# Patient Record
Sex: FEMALE | Race: BLACK OR AFRICAN AMERICAN | NOT HISPANIC OR LATINO | Employment: FULL TIME | URBAN - METROPOLITAN AREA
[De-identification: names, ages, dates, MRNs, and addresses within clinical notes are randomized per-mention and may not be internally consistent; named-entity substitution may affect disease eponyms.]

---

## 2017-08-04 ENCOUNTER — HOSPITAL ENCOUNTER (EMERGENCY)
Facility: OTHER | Age: 37
Discharge: HOME | End: 2017-08-04
Attending: EMERGENCY MEDICINE
Payer: MEDICAID

## 2017-08-04 VITALS
TEMPERATURE: 99 F | BODY MASS INDEX: 23 KG/M2 | DIASTOLIC BLOOD PRESSURE: 88 MMHG | HEIGHT: 62 IN | SYSTOLIC BLOOD PRESSURE: 137 MMHG | HEART RATE: 91 BPM | WEIGHT: 125 LBS | RESPIRATION RATE: 16 BRPM

## 2017-08-04 DIAGNOSIS — S60.229A CONTUSION OF DORSUM OF HAND: Primary | ICD-10-CM

## 2017-08-04 DIAGNOSIS — J06.9 URI WITH COUGH AND CONGESTION: ICD-10-CM

## 2017-08-04 LAB
B-HCG UR QL: NEGATIVE
CTP QC/QA: YES

## 2017-08-04 PROCEDURE — 99284 EMERGENCY DEPT VISIT MOD MDM: CPT

## 2017-08-04 PROCEDURE — 63600175 PHARM REV CODE 636 W HCPCS: Performed by: EMERGENCY MEDICINE

## 2017-08-04 PROCEDURE — 81025 URINE PREGNANCY TEST: CPT | Performed by: EMERGENCY MEDICINE

## 2017-08-04 PROCEDURE — 25000003 PHARM REV CODE 250: Performed by: EMERGENCY MEDICINE

## 2017-08-04 PROCEDURE — 90715 TDAP VACCINE 7 YRS/> IM: CPT | Performed by: EMERGENCY MEDICINE

## 2017-08-04 PROCEDURE — 90471 IMMUNIZATION ADMIN: CPT | Performed by: EMERGENCY MEDICINE

## 2017-08-04 RX ORDER — HYDROCODONE BITARTRATE AND ACETAMINOPHEN 5; 325 MG/1; MG/1
1 TABLET ORAL
Status: COMPLETED | OUTPATIENT
Start: 2017-08-04 | End: 2017-08-04

## 2017-08-04 RX ORDER — IBUPROFEN 400 MG/1
800 TABLET ORAL
Status: COMPLETED | OUTPATIENT
Start: 2017-08-04 | End: 2017-08-04

## 2017-08-04 RX ORDER — HYDROCODONE BITARTRATE AND ACETAMINOPHEN 5; 325 MG/1; MG/1
1 TABLET ORAL EVERY 4 HOURS PRN
Qty: 10 TABLET | Refills: 0 | Status: SHIPPED | OUTPATIENT
Start: 2017-08-04 | End: 2017-08-14

## 2017-08-04 RX ORDER — IBUPROFEN 600 MG/1
600 TABLET ORAL EVERY 6 HOURS PRN
Qty: 20 TABLET | Refills: 0 | Status: SHIPPED | OUTPATIENT
Start: 2017-08-04 | End: 2018-02-06

## 2017-08-04 RX ADMIN — CLOSTRIDIUM TETANI TOXOID ANTIGEN (FORMALDEHYDE INACTIVATED), CORYNEBACTERIUM DIPHTHERIAE TOXOID ANTIGEN (FORMALDEHYDE INACTIVATED), BORDETELLA PERTUSSIS TOXOID ANTIGEN (GLUTARALDEHYDE INACTIVATED), BORDETELLA PERTUSSIS FILAMENTOUS HEMAGGLUTININ ANTIGEN (FORMALDEHYDE INACTIVATED), BORDETELLA PERTUSSIS PERTACTIN ANTIGEN, AND BORDETELLA PERTUSSIS FIMBRIAE 2/3 ANTIGEN 0.5 ML: 5; 2; 2.5; 5; 3; 5 INJECTION, SUSPENSION INTRAMUSCULAR at 01:08

## 2017-08-04 RX ADMIN — HYDROCODONE BITARTRATE AND ACETAMINOPHEN 1 TABLET: 5; 325 TABLET ORAL at 01:08

## 2017-08-04 RX ADMIN — IBUPROFEN 800 MG: 400 TABLET, FILM COATED ORAL at 01:08
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
## 2018-02-06 ENCOUNTER — HOSPITAL ENCOUNTER (EMERGENCY)
Facility: OTHER | Age: 38
Discharge: HOME | End: 2018-02-06
Attending: EMERGENCY MEDICINE
Payer: MEDICAID

## 2018-02-06 VITALS
WEIGHT: 123 LBS | DIASTOLIC BLOOD PRESSURE: 80 MMHG | TEMPERATURE: 98 F | BODY MASS INDEX: 22.63 KG/M2 | SYSTOLIC BLOOD PRESSURE: 131 MMHG | HEIGHT: 62 IN | RESPIRATION RATE: 22 BRPM | OXYGEN SATURATION: 100 % | HEART RATE: 93 BPM

## 2018-02-06 DIAGNOSIS — R06.02 SHORTNESS OF BREATH: Primary | ICD-10-CM

## 2018-02-06 LAB
B-HCG UR QL: NEGATIVE
CTP QC/QA: YES

## 2018-02-06 PROCEDURE — 99284 EMERGENCY DEPT VISIT MOD MDM: CPT

## 2018-02-06 PROCEDURE — 81025 URINE PREGNANCY TEST: CPT | Performed by: EMERGENCY MEDICINE

## 2018-02-06 PROCEDURE — 93010 ELECTROCARDIOGRAM REPORT: CPT | Mod: ,,, | Performed by: INTERNAL MEDICINE

## 2018-02-06 RX ORDER — BUDESONIDE AND FORMOTEROL FUMARATE DIHYDRATE 160; 4.5 UG/1; UG/1
2 AEROSOL RESPIRATORY (INHALATION) EVERY 12 HOURS
COMMUNITY
End: 2024-01-04

## 2018-02-06 NOTE — ED TRIAGE NOTES
"Pt c/o SOB with sharp epigastric pain last night. Pt reports that she felt SOB with the epigastric pain, unable to move secondary to pain. Pt reports that she pressed on her epigastric region & states "It felt like something moved & then I coulb breathe." Pt reports feeling anxious with the onset of SOB last night. Pt c/o chest heaviness. Pt has been using her inhaler with mild to moderate relief, but states "I just feel like I can't catch my breath."  "

## 2018-02-07 NOTE — ED PROVIDER NOTES
Encounter Date: 2018       History     Chief Complaint   Patient presents with    Shortness of Breath     Reports a history of asthma and anxiety and states she had an anxiety attack last night and it has not been improving today.      Patient is a 37-year-old female with history of asthma and anxiety who presents to the emergency department with shortness of breath.  She states over the last 24 hours she has been feeling very anxious and nervous.  She states she is unsure if this is her asthma her anxiety.  She states she no longer has albuterol.  She only has Symbicort.  She states she has used her Symbicort 7 times a day in hopes to relieve her shortness of breath.  She states she feels as if her heart is racing.  She denies any chest pain.  She denies fevers or chills.  She denies recent travel or recent surgery.  She denies lower extremity edema.  She states she is not on birth control.  She denies history of previous blood clot.  She denies recent illness.  She denies recent cough.      The history is provided by the patient.     Review of patient's allergies indicates:  No Known Allergies  Past Medical History:   Diagnosis Date    Asthma     Migraine headache      Past Surgical History:   Procedure Laterality Date     SECTION       History reviewed. No pertinent family history.  Social History   Substance Use Topics    Smoking status: Never Smoker    Smokeless tobacco: Never Used    Alcohol use No     Review of Systems   Constitutional: Negative for activity change, appetite change, chills, fatigue and fever.   HENT: Negative for congestion, ear discharge, ear pain, nosebleeds, postnasal drip, sore throat and trouble swallowing.    Eyes: Negative for photophobia and visual disturbance.   Respiratory: Positive for shortness of breath.    Cardiovascular: Negative for chest pain and leg swelling.   Gastrointestinal: Negative for abdominal pain, blood in stool, constipation, diarrhea, nausea  and vomiting.   Genitourinary: Negative for dysuria, flank pain and hematuria.   Musculoskeletal: Negative for back pain, neck pain and neck stiffness.   Skin: Negative for rash and wound.   Neurological: Negative for dizziness, syncope, speech difficulty, weakness, light-headedness, numbness and headaches.       Physical Exam     Initial Vitals [02/06/18 1114]   BP Pulse Resp Temp SpO2   (!) 158/102 110 (!) 22 98.1 °F (36.7 °C) 100 %      MAP       120.67         Physical Exam    Nursing note and vitals reviewed.  Constitutional: She appears well-developed and well-nourished. She is not diaphoretic.  Non-toxic appearance. No distress.   anxious   HENT:   Head: Normocephalic.   Right Ear: Hearing and external ear normal.   Left Ear: Hearing and external ear normal.   Nose: Nose normal.   Mouth/Throat: Uvula is midline, oropharynx is clear and moist and mucous membranes are normal. No trismus in the jaw. No uvula swelling. No oropharyngeal exudate.   Eyes: Conjunctivae are normal. Pupils are equal, round, and reactive to light.   Neck: Normal range of motion and full passive range of motion without pain. Neck supple. Normal range of motion present. No neck rigidity.   Cardiovascular: Normal rate, regular rhythm and normal heart sounds.   No lower extremity edema   Pulmonary/Chest: Breath sounds normal. No respiratory distress. She has no wheezes. She has no rhonchi. She has no rales. She exhibits no tenderness.   Abdominal: Soft. Bowel sounds are normal. There is no tenderness.   Lymphadenopathy:     She has no cervical adenopathy.   Neurological: She is alert and oriented to person, place, and time. She has normal strength.   Skin: Skin is warm and dry. Capillary refill takes less than 2 seconds.   Psychiatric: Her mood appears anxious. Her speech is rapid and/or pressured. She expresses no homicidal and no suicidal ideation. She expresses no suicidal plans and no homicidal plans.         ED Course    Procedures  Labs Reviewed   POCT URINE PREGNANCY     EKG Readings: (Independently Interpreted)   Initial Reading: No STEMI. Heart Rate: 76.     ECG Results          EKG 12-lead (Final result)  Result time 02/06/18 16:48:25    Final result by Interface, Lab In [facility] (02/06/18 16:48:25)                 Narrative:    Test Reason : R06.02  Blood Pressure :mmHG  Vent. Rate : 076 BPM     Atrial Rate : 076 BPM     P-R Int : 122 ms          QRS Dur : 086 ms      QT Int : 372 ms       P-R-T Axes : 072 081 057 degrees     QTc Int : 418 ms    Normal sinus rhythm with sinus arrhythmia  Normal ECG    Confirmed by Adalid England MD ([id number]) on 2/6/2018 4:48:13 PM    Referred By: LILIANA   SELF           Confirmed By:Adalid England MD                            X-Rays:   Independently Interpreted Readings:   Other Readings:  No acute cardiopulmonary process    Imaging Results          X-Ray Chest PA And Lateral (Final result)  Result time 02/06/18 13:21:10    Final result by Rochelle Wilson MD (02/06/18 13:21:10)                 Impression:     No acute cardiopulmonary process.          Electronically signed by: ROCHELLE WILSON MD  Date:     02/06/18  Time:    13:21              Narrative:    Chest PA and lateral    Indication:Shortness of breath    Comparison:None    Findings:  The cardiomediastinal silhouette is not enlarged.  There is no pleural effusion.  The trachea is midline.  The lungs are symmetrically expanded bilaterally without evidence of acute parenchymal process. No large focal consolidation seen.  There is no pneumothorax.  The osseous structures remarkable for mild dextroscoliotic curvature of the thoracic spine.                              Medical Decision Making:   Initial Assessment:   Urgent evaluation of 37-year-old female with history of anxiety and asthma who presents to the emergency department with shortness of breath.  Patient is afebrile, nontoxic appearing, and hemodynamically stable.   Lungs are clear to auscultation.  No lower extremity edema.  Patient is not hypoxic.  Patient is very anxious on initial exam.  She is tachycardic initially which I suspect is secondary to her anxious state.  EKG and chest x-ray will be obtained.  ED Management:  EKG reveals no acute ischemia.  Patient is no longer tachycardic.  Patient is not hypoxic.  Chest x-ray reveals no acute cardiopulmonary process.  I do not suspect asthma exacerbation.  I do not suspect PE.  Upon reevaluation, patient states she is feeling much better and is ready to be discharged.  She is advised to follow-up with PCP.  She is instructed to not use Symbicort as a rescue inhaler.  She is advised to return to the emergency department with any worsening symptoms or concerns.  Other:   I have discussed this case with another health care provider.       <> Summary of the Discussion: Dr. Bo                   ED Course      Clinical Impression:   The encounter diagnosis was Shortness of breath.                           Dana Price PA-C  02/06/18 1703

## 2018-05-11 ENCOUNTER — HOSPITAL ENCOUNTER (EMERGENCY)
Facility: OTHER | Age: 38
Discharge: HOME | End: 2018-05-11
Attending: EMERGENCY MEDICINE
Payer: MEDICAID

## 2018-05-11 VITALS
WEIGHT: 120 LBS | HEIGHT: 62 IN | BODY MASS INDEX: 22.08 KG/M2 | OXYGEN SATURATION: 100 % | SYSTOLIC BLOOD PRESSURE: 137 MMHG | DIASTOLIC BLOOD PRESSURE: 85 MMHG | TEMPERATURE: 98 F | RESPIRATION RATE: 18 BRPM | HEART RATE: 65 BPM
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
## 2018-05-11 DIAGNOSIS — S63.502A LEFT WRIST SPRAIN, INITIAL ENCOUNTER: Primary | ICD-10-CM

## 2018-05-11 LAB
B-HCG UR QL: NEGATIVE
CTP QC/QA: YES

## 2018-05-11 PROCEDURE — 25000003 PHARM REV CODE 250: Performed by: EMERGENCY MEDICINE

## 2018-05-11 PROCEDURE — 99284 EMERGENCY DEPT VISIT MOD MDM: CPT | Mod: 25

## 2018-05-11 PROCEDURE — 81025 URINE PREGNANCY TEST: CPT | Performed by: EMERGENCY MEDICINE

## 2018-05-11 RX ORDER — IBUPROFEN 600 MG/1
600 TABLET ORAL
Status: COMPLETED | OUTPATIENT
Start: 2018-05-11 | End: 2018-05-11

## 2018-05-11 RX ORDER — OXYCODONE AND ACETAMINOPHEN 5; 325 MG/1; MG/1
1 TABLET ORAL EVERY 6 HOURS PRN
Qty: 5 TABLET | Refills: 0 | Status: SHIPPED | OUTPATIENT
Start: 2018-05-11 | End: 2018-11-04 | Stop reason: SDUPTHER

## 2018-05-11 RX ORDER — IBUPROFEN 600 MG/1
600 TABLET ORAL EVERY 6 HOURS PRN
Qty: 9 TABLET | Refills: 0 | Status: SHIPPED | OUTPATIENT
Start: 2018-05-11 | End: 2018-09-12

## 2018-05-11 RX ADMIN — IBUPROFEN 600 MG: 600 TABLET, FILM COATED ORAL at 04:05

## 2018-05-11 NOTE — ED PROVIDER NOTES
Encounter Date: 2018    SCRIBE #1 NOTE: ICassandra, am scribing for, and in the presence of, Dr. Chang.       History     Chief Complaint   Patient presents with    Fall     Pt tripped and fell while walking her dog. C/o left wrist pain.        2018 4:10 PM     Chief complaint: Wrist pain      Anay Louis is a 37 y.o. female with no pertinent medical history who presents to the ED with complaints of left wrist pain radiating to her thumb s/p mechanical fall onto outstretched hand that occurred around 12:45 PM today. The patient reports that she fell after getting caught in the leash when her dog suddenly started chasing a cat. She states that she did not hit her head or lose consciousness. The pain is exacerbated by movement, and is not improved with ice, and the patient states that she has not taken any medication for the pain. The pt states that she is ambidextrous, but primarily uses her right hand. She has no additional complaints at this time.       The history is provided by the patient.     Review of patient's allergies indicates:  No Known Allergies  Past Medical History:   Diagnosis Date    Asthma     Migraine headache      Past Surgical History:   Procedure Laterality Date     SECTION       History reviewed. No pertinent family history.  Social History   Substance Use Topics    Smoking status: Never Smoker    Smokeless tobacco: Never Used    Alcohol use No     Review of Systems   Constitutional: Negative for fever.   HENT: Negative for sore throat.    Respiratory: Negative for shortness of breath.    Cardiovascular: Negative for chest pain.   Gastrointestinal: Negative for nausea.   Genitourinary: Negative for dysuria.   Musculoskeletal: Positive for arthralgias ( left wrist). Negative for back pain.   Skin: Negative for rash.   Neurological: Negative for weakness.        Negative for LOC.   Hematological: Does not bruise/bleed easily.       Physical Exam     Initial  Vitals [05/11/18 1435]   BP Pulse Resp Temp SpO2   125/81 74 18 97.2 °F (36.2 °C) 100 %      MAP       95.67         Physical Exam    Nursing note and vitals reviewed.  Constitutional: She appears well-developed and well-nourished. She is not diaphoretic. No distress.   HENT:   Head: Normocephalic and atraumatic.   Eyes: EOM are normal. Pupils are equal, round, and reactive to light.   Neck: Normal range of motion. Neck supple.   Cardiovascular: Normal rate, regular rhythm and normal heart sounds. Exam reveals no gallop and no friction rub.    No murmur heard.  Pulses:       Radial pulses are 2+ on the left side.   Pulmonary/Chest: Breath sounds normal. No respiratory distress. She has no wheezes.   Musculoskeletal: Normal range of motion.   There is no tenderness noted over the left scaphoid. There is tenderness over the left distal radius. There is mild tenderness to palpation over the left ulna and the first metacarpal.    Neurological: She is alert and oriented to person, place, and time. She has normal strength and normal reflexes.   Skin: Skin is warm and dry. Capillary refill takes less than 2 seconds. No rash noted.   Cap refill to the LUE is < 2s.   Psychiatric: She has a normal mood and affect.         ED Course   Procedures  Labs Reviewed   POCT URINE PREGNANCY        Imaging Results          X-Ray Wrist Navicular Views Left (Final result)  Result time 05/11/18 16:42:36    Final result by Gee Rosado MD (05/11/18 16:42:36)                 Impression:      As above.      Electronically signed by: Gee Rosado MD  Date:    05/11/2018  Time:    16:42             Narrative:    EXAMINATION:  XR WRIST NAVICULAR VIEWS LEFT    CLINICAL HISTORY:  trauma;    TECHNIQUE:  Left wrist, four views    COMPARISON:  None    05/11/2018    FINDINGS:  No fracture or dislocation.  Navicular is intact.  Note made of lunotriquetral coalition.  Cartilage spaces are maintained. Soft tissues are unremarkable.                                X-Ray Hand 3 View Left (Final result)  Result time 05/11/18 16:43:05    Final result by Gee Rosado MD (05/11/18 16:43:05)                 Impression:      As above.      Electronically signed by: Gee Rosado MD  Date:    05/11/2018  Time:    16:43             Narrative:    EXAMINATION:  XR HAND COMPLETE 3 VIEW LEFT    CLINICAL HISTORY:  Trauma;    TECHNIQUE:  PA, lateral, and oblique views of the left hand were performed.    COMPARISON:  08/04/2017    FINDINGS:  No fracture or dislocation.  No periarticular osteopenia or erosion.  Cartilage spaces are maintained.  Lunotriquetral coalition noted.  Soft tissues are unremarkable.                               X-Ray Wrist Complete Left (Final result)  Result time 05/11/18 15:05:53    Final result by Angelita Coello MD (05/11/18 15:05:53)                 Impression:      As above      Electronically signed by: Angelita Coello MD  Date:    05/11/2018  Time:    15:05             Narrative:    EXAMINATION:  XR WRIST COMPLETE 3 VIEWS LEFT    CLINICAL HISTORY:  Unspecified fall, initial encounter    TECHNIQUE:  PA, lateral, and oblique views of the left wrist were performed.    COMPARISON:  None    FINDINGS:  There is no evidence of fracture or osseous destruction.  No significant degenerative changes present.                                X-Rays:   Independently Interpreted Readings:   Other Readings:  X-ray left hand: No acute fractures or dislocations visualized.   X-ray left scaphoid: No acute fracture or dislocation visualized.    Medical Decision Making:   History:   Old Medical Records: I decided to obtain old medical records.  Clinical Tests:   Lab Tests: Ordered and Reviewed  Radiological Study: Ordered and Reviewed            Scribe Attestation:   Scribe #1: I performed the above scribed service and the documentation accurately describes the services I performed. I attest to the accuracy of the note.    Attending Attestation:            Physician Attestation for Scribe:  Physician Attestation Statement for Scribe #1: I, Dr. Hunter, reviewed documentation, as scribed by Cassandra Ayala in my presence, and it is both accurate and complete.         Attending ED Notes:   Emergent evaluation a 37-year-old female with left wrist and hand pain status post trauma.  Patient no vastly intact without focal neurologic deficits.  No deformity, crepitus or step-offs.  X-rays are negative for any acute fractures or dislocations.  No tenderness over the scaphoid bone.  The patient is extensively counseled on her diagnosis and treatment including all diagnostic and physical exam findings.  The patient discharged good condition and directed follow-up with her PCP in the next 24-48 hours.             Clinical Impression:   The primary encounter diagnosis was Left wrist sprain, initial encounter. A diagnosis of Fall was also pertinent to this visit.                           Jose Cruz Hunter MD  05/12/18 [id number]

## 2018-09-12 ENCOUNTER — HOSPITAL ENCOUNTER (EMERGENCY)
Facility: OTHER | Age: 38
Discharge: HOME | End: 2018-09-12
Attending: EMERGENCY MEDICINE
Payer: MEDICAID

## 2018-09-12 VITALS
BODY MASS INDEX: 22.08 KG/M2 | RESPIRATION RATE: 18 BRPM | DIASTOLIC BLOOD PRESSURE: 63 MMHG | HEIGHT: 62 IN | SYSTOLIC BLOOD PRESSURE: 127 MMHG | TEMPERATURE: 98 F | WEIGHT: 120 LBS | OXYGEN SATURATION: 100 % | HEART RATE: 73 BPM

## 2018-09-12 DIAGNOSIS — S39.012A STRAIN OF LUMBAR REGION, INITIAL ENCOUNTER: ICD-10-CM

## 2018-09-12 DIAGNOSIS — S29.019A THORACIC MYOFASCIAL STRAIN, INITIAL ENCOUNTER: Primary | ICD-10-CM

## 2018-09-12 DIAGNOSIS — M54.9 BACK PAIN: ICD-10-CM

## 2018-09-12 LAB
B-HCG UR QL: NEGATIVE
B-HCG UR QL: NEGATIVE
CTP QC/QA: YES
CTP QC/QA: YES

## 2018-09-12 PROCEDURE — 99284 EMERGENCY DEPT VISIT MOD MDM: CPT | Mod: 25

## 2018-09-12 PROCEDURE — 81025 URINE PREGNANCY TEST: CPT | Performed by: EMERGENCY MEDICINE

## 2018-09-12 PROCEDURE — 63600175 PHARM REV CODE 636 W HCPCS: Performed by: PHYSICIAN ASSISTANT

## 2018-09-12 PROCEDURE — 96372 THER/PROPH/DIAG INJ SC/IM: CPT

## 2018-09-12 RX ORDER — KETOROLAC TROMETHAMINE 30 MG/ML
15 INJECTION, SOLUTION INTRAMUSCULAR; INTRAVENOUS
Status: COMPLETED | OUTPATIENT
Start: 2018-09-12 | End: 2018-09-12

## 2018-09-12 RX ORDER — IBUPROFEN 600 MG/1
600 TABLET ORAL EVERY 6 HOURS PRN
Qty: 20 TABLET | Refills: 0 | Status: SHIPPED | OUTPATIENT
Start: 2018-09-12 | End: 2018-11-02 | Stop reason: SDUPTHER

## 2018-09-12 RX ADMIN — KETOROLAC TROMETHAMINE 15 MG: 30 INJECTION, SOLUTION INTRAMUSCULAR at 03:09

## 2018-09-12 NOTE — ED PROVIDER NOTES
"Encounter Date: 2018       History     Chief Complaint   Patient presents with    Back Pain     middle back pain, "for some time"     Patient is 38 year old female who presents with complaints of mid and lower back pain that has been present for 3 days PTA. She reports that she is usually seen at a pain management clinic for neck pain. She admits that she was told that because this was "new pain" she needed x-rays before she could be treated. Her neck pain is currently managed by a muscle relaxer tizanidine. She retorts that this has not bee helping this mid-lower back pain. She admits that this current back pain started when she was walking her pit bull two days ago. She reports the dog jolted her and she admits ever since she has had some back pain. She reports no associated fever, chills, nausea, vomiting, chest pain or shortness of breath.  No associated bladder or bowel incontinence or saddle anesthesia.  No history of IV drug abuse or back surgeries.  She has not been taking any medications in addition to the prescribed tizanidine.  Currently unaccompanied in the ER.          Review of patient's allergies indicates:  No Known Allergies  Past Medical History:   Diagnosis Date    Asthma     Migraine headache      Past Surgical History:   Procedure Laterality Date     SECTION       No family history on file.  Social History     Tobacco Use    Smoking status: Never Smoker    Smokeless tobacco: Never Used   Substance Use Topics    Alcohol use: No    Drug use: No     Review of Systems   Constitutional: Negative for fever.   HENT: Negative for sore throat.    Respiratory: Negative for shortness of breath.    Cardiovascular: Negative for chest pain.   Gastrointestinal: Negative for nausea.   Genitourinary: Negative for dysuria.   Musculoskeletal: Positive for back pain.   Skin: Negative for rash.   Neurological: Negative for weakness.   Hematological: Does not bruise/bleed easily.       Physical " Exam     Initial Vitals [09/12/18 1444]   BP Pulse Resp Temp SpO2   127/89 91 18 98.7 °F (37.1 °C) 100 %      MAP       --         Physical Exam    Nursing note and vitals reviewed.  Constitutional: She appears well-developed and well-nourished. She is not diaphoretic. No distress.   Healthy appearing female in NAD who appears uncomfortable during interview and exam. She is able to sit in upright position and transitions to standing position very cautiously and winces in pain when doing so.  She is able to ambulate without assistance.   HENT:   Head: Normocephalic and atraumatic.   Eyes: Conjunctivae and EOM are normal. Pupils are equal, round, and reactive to light. Right eye exhibits no discharge. Left eye exhibits no discharge. No scleral icterus.   Neck: Normal range of motion.   Cardiovascular: Normal rate, regular rhythm, normal heart sounds and intact distal pulses. Exam reveals no gallop and no friction rub.    No murmur heard.  Pulmonary/Chest: Breath sounds normal. She has no wheezes. She has no rhonchi. She has no rales.   Abdominal: Soft. Bowel sounds are normal. There is no tenderness. There is no rebound and no guarding.   Musculoskeletal: Normal range of motion. She exhibits no edema or tenderness.   Patient winces in pain with thoracic and lumbar midline palpation there is no bony crepitus or step-offs appreciated.  There is also thoracic and lumbar paraspinal musculature tenderness to palpation with no overlying skin changes.   Lymphadenopathy:     She has no cervical adenopathy.   Neurological: She is alert and oriented to person, place, and time. She has normal strength. No cranial nerve deficit or sensory deficit.   Normal gait   Skin: Skin is warm. No rash and no abscess noted. No erythema.   Psychiatric: She has a normal mood and affect. Her behavior is normal. Thought content normal.         ED Course   Procedures  Labs Reviewed   POCT URINE PREGNANCY         Imaging Results          X-Ray  Thoracic Spine AP Lateral (Final result)  Result time 09/12/18 16:30:58    Final result by Angelita Coello MD (09/12/18 16:30:58)                 Impression:      As above      Electronically signed by: Angelita Coello MD  Date:    09/12/2018  Time:    16:30             Narrative:    EXAMINATION:  XR THORACIC SPINE AP LATERAL    CLINICAL HISTORY:  Dorsalgia, unspecified    TECHNIQUE:  Two views    COMPARISON:  None    FINDINGS:  There is slight curvature of the thoracic spine.  Vertebral bodies are normal in height and alignment.  There is no significant degree of degenerative change.                               X-Ray Lumbar Spine Ap And Lateral (Final result)  Result time 09/12/18 16:32:00    Final result by Angelita Coello MD (09/12/18 16:32:00)                 Impression:      No significant abnormality      Electronically signed by: Angelita Coello MD  Date:    09/12/2018  Time:    16:32             Narrative:    EXAMINATION:  XR LUMBAR SPINE AP AND LATERAL    CLINICAL HISTORY:  back pain;Dorsalgia, unspecified    TECHNIQUE:  AP, lateral and spot images were performed of the lumbar spine.    COMPARISON:  None    FINDINGS:  Vertebral bodies are normally aligned and normal in height.  Disc spaces are maintained without significant osteophytic spurring.                                     Medical Decision Making:   ED Management:  Urgent evaluation a 38-year-old female who presents with complaints of back pain likely related to musculoskeletal strain from being jolted while walking her dog.  She is afebrile, nontoxic appearing, hemodynamically stable.  Physical exam outlined above and reveals soft tissue tenderness to palpation with overlying the thoracic and lumbar midline tenderness to palpation as well however there is no bony crepitus or step-off.  X-rays pending.  Will give Toradol and reassess.    Update:  X-ray reveals nothing acute. The patient reports feeling better after Toradol.  Will discharge  with ibuprofen 600 mg to be taken every 6 hr for at least 3 days in conjunction with her previously prescribed muscle relaxer.  She is encouraged to follow up with her pain management doctor in the outpatient setting.  She is educated on ED return precautions and verbalizes understanding.                      Clinical Impression:   The primary encounter diagnosis was Thoracic myofascial strain, initial encounter. Diagnoses of Back pain and Strain of lumbar region, initial encounter were also pertinent to this visit.                             Cleo Andrews PA-C  09/12/18 3686

## 2018-09-12 NOTE — ED NOTES
"Pt reporting mid lower back pain x 1 week after "I was walking my dog earlier thi week and I had to run after him on his leash and I may have moved weird." Pt denies numbness in bilateral LE's, or loss of bowel or bladder dysfunction. No bruising or deformity noted to back. Pt AAOx4 and appropriate at this time. Respirations even and unlabored. No acute distress noted.    "

## 2018-11-02 ENCOUNTER — HOSPITAL ENCOUNTER (EMERGENCY)
Facility: OTHER | Age: 38
Discharge: HOME | End: 2018-11-03
Attending: EMERGENCY MEDICINE
Payer: MEDICAID

## 2018-11-02 DIAGNOSIS — S63.011A: ICD-10-CM

## 2018-11-02 DIAGNOSIS — M25.531 RIGHT WRIST PAIN: ICD-10-CM

## 2018-11-02 DIAGNOSIS — S62.521A CLOSED FRACTURE OF BASE OF DISTAL PHALANX OF RIGHT THUMB: Primary | ICD-10-CM

## 2018-11-02 LAB
B-HCG UR QL: NEGATIVE
CTP QC/QA: YES

## 2018-11-02 PROCEDURE — 29125 APPL SHORT ARM SPLINT STATIC: CPT | Mod: RT

## 2018-11-02 PROCEDURE — 99283 EMERGENCY DEPT VISIT LOW MDM: CPT | Mod: 25

## 2018-11-02 PROCEDURE — 81025 URINE PREGNANCY TEST: CPT | Performed by: EMERGENCY MEDICINE

## 2018-11-02 PROCEDURE — 25000003 PHARM REV CODE 250: Performed by: EMERGENCY MEDICINE

## 2018-11-02 RX ORDER — IBUPROFEN 400 MG/1
800 TABLET ORAL
Status: COMPLETED | OUTPATIENT
Start: 2018-11-02 | End: 2018-11-02

## 2018-11-02 RX ORDER — IBUPROFEN 400 MG/1
400 TABLET ORAL EVERY 6 HOURS PRN
Qty: 20 TABLET | Refills: 0 | Status: SHIPPED | OUTPATIENT
Start: 2018-11-02 | End: 2019-02-04

## 2018-11-02 RX ORDER — ACETAMINOPHEN 500 MG
1000 TABLET ORAL
Status: COMPLETED | OUTPATIENT
Start: 2018-11-02 | End: 2018-11-02

## 2018-11-02 RX ADMIN — ACETAMINOPHEN 1000 MG: 500 TABLET, FILM COATED ORAL at 10:11

## 2018-11-02 RX ADMIN — IBUPROFEN 800 MG: 400 TABLET, FILM COATED ORAL at 11:11

## 2018-11-03 VITALS
DIASTOLIC BLOOD PRESSURE: 80 MMHG | BODY MASS INDEX: 21.16 KG/M2 | HEART RATE: 77 BPM | HEIGHT: 62 IN | WEIGHT: 115 LBS | OXYGEN SATURATION: 95 % | TEMPERATURE: 98 F | SYSTOLIC BLOOD PRESSURE: 139 MMHG | RESPIRATION RATE: 18 BRPM

## 2018-11-03 NOTE — ED TRIAGE NOTES
"Pt presents to the ed with c/o right hand injury. Pt states, " I hurt my thumb and wrist trying to get the trunk of my car open." Pt is aao, RR even and unlabored. NAD noted.  "

## 2018-11-03 NOTE — ED PROVIDER NOTES
Encounter Date: 2018    SCRIBE #1 NOTE: IMary Beth, am scribing for, and in the presence of, Dr. Cotto.       History     Chief Complaint   Patient presents with    Hand Injury     R thumb, and wrist pain after twisting in car handle.      Time seen by provider: 9:57 PM    This is a 38 y.o. female who presents s/p right hand injury that occurred tonight. The patient states she was trying to open the trunk of her car when she twisted her right thumb. She reports pain to right thumb that radiates to remainder of right hand and wrist. She denies any numbness or tingling. Patient denies any other complaints at this time.      The history is provided by the patient.     Review of patient's allergies indicates:  No Known Allergies  Past Medical History:   Diagnosis Date    Asthma     Migraine headache      Past Surgical History:   Procedure Laterality Date     SECTION       No family history on file.  Social History     Tobacco Use    Smoking status: Never Smoker    Smokeless tobacco: Never Used   Substance Use Topics    Alcohol use: No    Drug use: No     Review of Systems   Constitutional: Negative for chills and fever.   HENT: Negative for congestion, sore throat and trouble swallowing.    Eyes: Negative for visual disturbance.   Respiratory: Negative for shortness of breath.    Cardiovascular: Negative for chest pain.   Gastrointestinal: Negative for abdominal pain, nausea and vomiting.   Endocrine: Negative.    Genitourinary: Negative for dysuria.   Musculoskeletal: Positive for arthralgias.        Positive for hand pain.   Skin: Negative for wound.   Neurological: Negative for numbness and headaches.        Negative for tingling.   Psychiatric/Behavioral: Negative for confusion.       Physical Exam     Initial Vitals [18 2148]   BP Pulse Resp Temp SpO2   (!) 157/89 87 16 99 °F (37.2 °C) 100 %      MAP       --         Physical Exam    Nursing note and vitals reviewed.  Constitutional:  She appears well-developed and well-nourished. No distress.   HENT:   Head: Normocephalic and atraumatic.   Cardiovascular: Normal rate, regular rhythm and intact distal pulses.   2+ distal pulses.   Pulmonary/Chest: No respiratory distress.   Musculoskeletal: Normal range of motion. She exhibits no edema.   Right thumb diffusely tender. No bony tenderness. No swelling of right hand. No bony tenderness of wrist.   Neurological: She is alert and oriented to person, place, and time. She has normal strength. No sensory deficit.   Sensation intact of all 5 digits.   Skin: Capillary refill takes less than 2 seconds. No rash noted.   Psychiatric: She has a normal mood and affect. Her behavior is normal. Judgment and thought content normal.         ED Course   Splint Application  Date/Time: 11/3/2018 5:32 AM  Performed by: Josue Cotto MD  Authorized by: Josue Cotto MD   Consent Done: Not Needed  Location details: left thumb  Splint type: thumb spica (and radial gutter)  Supplies used: Ortho-Glass,  cotton padding and elastic bandage  Post-procedure: The splinted body part was neurovascularly unchanged following the procedure.  Patient tolerance: Patient tolerated the procedure well with no immediate complications        Labs Reviewed   POCT URINE PREGNANCY          Imaging Results          X-Ray Wrist Complete Right (In process)                X-Ray Hand 3 view Right (In process)                  Medical Decision Making:   Clinical Tests:   Lab Tests: Ordered and Reviewed  Radiological Study: Ordered and Reviewed  ED Management:  Well-appearing patient presents with right hand and wrist pain after she jammed it while close to get a trunk.  She is tender over the thumb and the radial aspect of her wrist.  X-ray demonstrates a distal phalanx base fracture on her thumb, and radial ulnar distal subluxation.  Placed in a thumb spica and radial gutter.  Provided with Hand surgery follow-up.  Ibuprofen for pain.  No other  injuries.  Stable for discharge.    I did have an extensive talk regarding signs to return for and need for follow up. Patient expressed understanding and will monitor symptoms closely and follow-up as needed.      OCTAVIO Cotto M.D.  11/03/2018  7:14 AM              Scribe Attestation:   Scribe #1: I performed the above scribed service and the documentation accurately describes the services I performed. I attest to the accuracy of the note.    Attending Attestation:           Physician Attestation for Scribe:  Physician Attestation Statement for Scribe #1: I, Dr. Cotto, reviewed documentation, as scribed by Mary Beth Louis in my presence, and it is both accurate and complete.                    Clinical Impression:     1. Closed fracture of base of distal phalanx of right thumb    2. Right wrist pain    3. Closed traumatic subluxation of distal radioulnar joint of right wrist, initial encounter                                 Josue Cotto MD  11/03/18 [id number]

## 2018-11-04 ENCOUNTER — HOSPITAL ENCOUNTER (EMERGENCY)
Facility: OTHER | Age: 38
Discharge: HOME | End: 2018-11-04
Attending: EMERGENCY MEDICINE
Payer: MEDICAID

## 2018-11-04 VITALS
HEIGHT: 62 IN | OXYGEN SATURATION: 98 % | BODY MASS INDEX: 21.16 KG/M2 | TEMPERATURE: 99 F | DIASTOLIC BLOOD PRESSURE: 59 MMHG | RESPIRATION RATE: 18 BRPM | SYSTOLIC BLOOD PRESSURE: 102 MMHG | WEIGHT: 115 LBS | HEART RATE: 101 BPM

## 2018-11-04 DIAGNOSIS — M25.531 RIGHT WRIST PAIN: Primary | ICD-10-CM

## 2018-11-04 PROCEDURE — 99283 EMERGENCY DEPT VISIT LOW MDM: CPT

## 2018-11-04 RX ORDER — OXYCODONE AND ACETAMINOPHEN 5; 325 MG/1; MG/1
1 TABLET ORAL EVERY 6 HOURS PRN
Qty: 12 TABLET | Refills: 0 | OUTPATIENT
Start: 2018-11-04 | End: 2022-12-29

## 2018-11-04 NOTE — ED TRIAGE NOTES
Pt reports injuring her R thumb/wrist on Wednesday. Pt was seen Friday and splinted. Pt reports pain has become worse than it was on Friday. Swelling noted to digits on R hand. Reports some tingling.

## 2018-11-04 NOTE — ED PROVIDER NOTES
Encounter Date: 2018    SCRIBE #1 NOTE: IFer, adrian scribing for, and in the presence of, Dr. Jasmine.       History     Chief Complaint   Patient presents with    Wrist Pain     reports thumb/wrist fracture on Wednesday. splint applied Friday. pain uncontrolled with swelling to L hand finger. fingers warm with cap refill less than 3 seconds. fingers painful to touch     Time seen by provider: 3:29 PM    This is a 38 y.o. female who is presents with complaint of right wrist pain that began approximately five days ago. She was seen on 18 and was treated for an acute intra-articular fracture of the first digit. She also has a radial ulnar joint subluxation, and was sent home with in a splint. She is now experiencing finger swelling and tingling. She reports that she has been taking 400 mg ibuprofen prior to arrival which has not relieved her pain. The pain is mainly in the thumb, but it radiates to the wrist. She denies fever, chills, sore throat, chest pain, shortness of breath, cough, nausea, and dysuria. The patient reports that she is ambidextrous.      The history is provided by the patient.     Review of patient's allergies indicates:  No Known Allergies  Past Medical History:   Diagnosis Date    Asthma     Migraine headache      Past Surgical History:   Procedure Laterality Date     SECTION       History reviewed. No pertinent family history.  Social History     Tobacco Use    Smoking status: Never Smoker    Smokeless tobacco: Never Used   Substance Use Topics    Alcohol use: No    Drug use: No     Review of Systems   Constitutional: Negative for chills and fever.   HENT: Negative for congestion, rhinorrhea and sore throat.    Respiratory: Negative for cough and shortness of breath.    Cardiovascular: Negative for chest pain.   Gastrointestinal: Negative for nausea and vomiting.   Genitourinary: Negative for dysuria.   Musculoskeletal: Negative for back pain.        Positive  for right wrist and thumb pain. Positive for finger swelling.   Skin: Negative for rash.   Neurological: Negative for weakness.   Hematological: Does not bruise/bleed easily.       Physical Exam     Initial Vitals [11/04/18 1518]   BP Pulse Resp Temp SpO2   (!) 102/59 101 18 98.5 °F (36.9 °C) 98 %      MAP       --         Physical Exam    Nursing note and vitals reviewed.  Constitutional: She appears well-developed and well-nourished. She is not diaphoretic. No distress.   HENT:   Head: Normocephalic and atraumatic.   Mouth/Throat: Oropharynx is clear and moist.   Eyes: Conjunctivae and EOM are normal. Pupils are equal, round, and reactive to light.   Cardiovascular: Normal rate, regular rhythm and normal heart sounds. Exam reveals no gallop and no friction rub.    No murmur heard.  Pulses:       Radial pulses are 2+ on the right side, and 2+ on the left side.   Pulmonary/Chest: Breath sounds normal. No respiratory distress. She has no wheezes. She has no rhonchi. She has no rales.   Abdominal: Soft. There is no tenderness. There is no rebound and no guarding.   Musculoskeletal: Normal range of motion. She exhibits no edema or tenderness.   RUE: Mild swelling to all fingers and hand.    Neurological: She is alert and oriented to person, place, and time. She has normal strength.   Skin: Skin is warm and dry. Capillary refill takes less than 2 seconds. No rash and no abscess noted. No erythema. No pallor.   RUE: Warm, but not warmer than the other side.    Psychiatric: She has a normal mood and affect. Her behavior is normal. Judgment and thought content normal.         ED Course   Procedures  Labs Reviewed - No data to display       Imaging Results    None          Medical Decision Making:   ED Management:  Emergent evaluation of a 38-year-old female with complaint of ongoing hand and wrist pain, recently diagnosed with a thumb fracture and wrist subluxation.  On exam, she has good capillary refill and a normal  pulse.  There are marks left from her Ace wrap and I suspect her splint was a bit tight.  Her fingers are a little swollen, I suspect that she has not been elevating appropriately and that as swelling increase this further tightened her Ace wrap.  She was allowed to rest and splint was reapplied by me.  Afterwards she had good capillary refill and symptoms were improved.  She was discharged with ibuprofen and I have also added a short course of Norco to help with pain.  She is advised close follow-up with orthopedic hand specialist or to return here for any concerns or worsening symptoms.            Scribe Attestation:   Scribe #1: I performed the above scribed service and the documentation accurately describes the services I performed. I attest to the accuracy of the note.    Attending Attestation:           Physician Attestation for Scribe:  Physician Attestation Statement for Scribe #1: I, Dr. Jasmine, reviewed documentation, as scribed by Fer Pratt in my presence, and it is both accurate and complete.                    Clinical Impression:     1. Right wrist pain                                   Britta Jasmine MD  11/04/18 [id number]

## 2019-02-03 ENCOUNTER — HOSPITAL ENCOUNTER (EMERGENCY)
Facility: OTHER | Age: 39
Discharge: HOME | End: 2019-02-04
Attending: EMERGENCY MEDICINE
Payer: MEDICAID

## 2019-02-03 DIAGNOSIS — J45.901 EXACERBATION OF ASTHMA, UNSPECIFIED ASTHMA SEVERITY, UNSPECIFIED WHETHER PERSISTENT: Primary | ICD-10-CM

## 2019-02-03 DIAGNOSIS — R05.9 COUGH: ICD-10-CM

## 2019-02-03 LAB
B-HCG UR QL: NEGATIVE
CTP QC/QA: YES
INFLUENZA A, MOLECULAR: NEGATIVE
INFLUENZA B, MOLECULAR: NEGATIVE
SPECIMEN SOURCE: NORMAL

## 2019-02-03 PROCEDURE — 63600175 PHARM REV CODE 636 W HCPCS: Performed by: PHYSICIAN ASSISTANT

## 2019-02-03 PROCEDURE — 94644 CONT INHLJ TX 1ST HOUR: CPT

## 2019-02-03 PROCEDURE — 81025 URINE PREGNANCY TEST: CPT | Performed by: EMERGENCY MEDICINE

## 2019-02-03 PROCEDURE — 87502 INFLUENZA DNA AMP PROBE: CPT

## 2019-02-03 PROCEDURE — 25000242 PHARM REV CODE 250 ALT 637 W/ HCPCS: Performed by: PHYSICIAN ASSISTANT

## 2019-02-03 PROCEDURE — 99285 EMERGENCY DEPT VISIT HI MDM: CPT | Mod: 25

## 2019-02-03 RX ORDER — PREDNISONE 20 MG/1
60 TABLET ORAL
Status: COMPLETED | OUTPATIENT
Start: 2019-02-03 | End: 2019-02-03

## 2019-02-03 RX ORDER — IPRATROPIUM BROMIDE AND ALBUTEROL SULFATE 2.5; .5 MG/3ML; MG/3ML
3 SOLUTION RESPIRATORY (INHALATION)
Status: COMPLETED | OUTPATIENT
Start: 2019-02-03 | End: 2019-02-03

## 2019-02-03 RX ADMIN — IPRATROPIUM BROMIDE AND ALBUTEROL SULFATE 3 ML: .5; 3 SOLUTION RESPIRATORY (INHALATION) at 11:02

## 2019-02-03 RX ADMIN — PREDNISONE 60 MG: 20 TABLET ORAL at 11:02

## 2019-02-04 VITALS
OXYGEN SATURATION: 100 % | TEMPERATURE: 98 F | HEIGHT: 62 IN | SYSTOLIC BLOOD PRESSURE: 110 MMHG | HEART RATE: 86 BPM | WEIGHT: 120 LBS | DIASTOLIC BLOOD PRESSURE: 64 MMHG | BODY MASS INDEX: 22.08 KG/M2 | RESPIRATION RATE: 20 BRPM

## 2019-02-04 RX ORDER — PREDNISONE 20 MG/1
40 TABLET ORAL DAILY
Qty: 10 TABLET | Refills: 0 | Status: SHIPPED | OUTPATIENT
Start: 2019-02-04 | End: 2019-02-09

## 2019-02-04 RX ORDER — IBUPROFEN 600 MG/1
600 TABLET ORAL EVERY 6 HOURS PRN
Qty: 20 TABLET | Refills: 0 | OUTPATIENT
Start: 2019-02-04 | End: 2020-03-14

## 2019-02-04 RX ORDER — ALBUTEROL SULFATE 90 UG/1
1-2 AEROSOL, METERED RESPIRATORY (INHALATION) EVERY 6 HOURS PRN
Qty: 1 INHALER | Refills: 0 | Status: SHIPPED | OUTPATIENT
Start: 2019-02-04 | End: 2020-03-14 | Stop reason: SDUPTHER

## 2019-02-04 NOTE — ED NOTES
NEURO: Pt AAO x 4. Behavior and speech appropriate to situation.   CARDIAC: Regular rhythm auscultated.   RESPIRATORY: Respirations even and unlabored. Breath sounds clear to all lung fields. No wheezing appreciated   MUSCULOSKELETAL: Active ROM noted to extremities. Left upper chest tender to palpation     Pt is well appearing pt in NAD.

## 2019-02-04 NOTE — ED NOTES
Pt sitting in chair, respirations even, unlabored, eyes open spontaneously, NAD noted, call bell within reach. Pt updated on plan of care will continue to monitor

## 2019-02-04 NOTE — ED PROVIDER NOTES
Encounter Date: 2/3/2019       History     Chief Complaint   Patient presents with    Cough     X about a week with chest tightness, no relief for inhaler, Hx asthma     Patient is 38 year old female who presents with complaints of cough, chest tightness, nasal congestion for the past week.  She has been using her albuterol inhaler at home with no relief.  She reports persistent coughing with occasional audible wheezing.  She is not a cigarette smoker.  She has no report of hemoptysis.  She does report she became concerned today when she had some chest discomfort with coughing.  She reports no associated fever, chills, nausea or vomiting.  She has a history of admission from asthma in the past and denies history of intubation.  She has no known sick contacts or recent travel.  She is currently unaccompanied in the ER.          Review of patient's allergies indicates:  No Known Allergies  Past Medical History:   Diagnosis Date    Asthma     Migraine headache      Past Surgical History:   Procedure Laterality Date     SECTION       No family history on file.  Social History     Tobacco Use    Smoking status: Never Smoker    Smokeless tobacco: Never Used   Substance Use Topics    Alcohol use: No    Drug use: No     Review of Systems   Constitutional: Negative for fever.   HENT: Positive for congestion. Negative for sore throat.    Respiratory: Positive for cough and chest tightness. Negative for shortness of breath.    Cardiovascular: Negative for chest pain.   Gastrointestinal: Negative for nausea.   Genitourinary: Negative for dysuria.   Musculoskeletal: Negative for back pain.   Skin: Negative for rash.   Neurological: Negative for weakness.   Hematological: Does not bruise/bleed easily.       Physical Exam     Initial Vitals [19 2233]   BP Pulse Resp Temp SpO2   (!) 130/90 80 18 98.3 °F (36.8 °C) 100 %      MAP       --         Physical Exam    Nursing note and vitals  reviewed.  Constitutional: She appears well-developed and well-nourished. She is not diaphoretic. No distress.   Healthy-appearing female in no acute distress or apparent pain. She makes good eye contact, speaks in clear full sentences and ambulates with ease.  Occasional coughing during interview and exam especially with deep inspiration.   HENT:   Head: Normocephalic and atraumatic.   Right Ear: External ear normal.   Left Ear: External ear normal.   Nose: Nose normal.   Mouth/Throat: Oropharynx is clear and moist. No oropharyngeal exudate.   Posterior oropharynx has slight erythema without edema or exudate.  Uvula is midline  There is no trismus or lingual elevation  TMs slightly erythematous bilaterally     Eyes: Conjunctivae and EOM are normal. Pupils are equal, round, and reactive to light. Right eye exhibits no discharge. Left eye exhibits no discharge. No scleral icterus.   Neck: Normal range of motion.   No meningismus   Cardiovascular: Normal rate, regular rhythm, normal heart sounds and intact distal pulses. Exam reveals no gallop and no friction rub.    No murmur heard.  Pulmonary/Chest: Breath sounds normal. She has no wheezes. She has no rhonchi. She has no rales.   Diminished air flow bilaterally with no wheezing or rales or rhonchi.   Abdominal: Soft. Bowel sounds are normal. There is no tenderness. There is no rebound and no guarding.   Benign abdomen   Musculoskeletal: Normal range of motion. She exhibits no edema or tenderness.   Lymphadenopathy:     She has no cervical adenopathy.   Neurological: She is alert and oriented to person, place, and time. She has normal strength.   Skin: Skin is warm. Capillary refill takes less than 2 seconds. No rash and no abscess noted. No erythema. No pallor.   Psychiatric: She has a normal mood and affect. Her behavior is normal. Thought content normal.         ED Course   Procedures  Labs Reviewed   POCT URINE PREGNANCY         Labs Reviewed   INFLUENZA A & B BY  MOLECULAR   POCT URINE PREGNANCY     Imaging Results          X-Ray Chest PA And Lateral (Final result)  Result time 02/04/19 00:36:23    Final result by Joleen Odell MD (02/04/19 00:36:23)                 Impression:      No acute intrathoracic process identified.      Electronically signed by: Joleen Odell MD  Date:    02/04/2019  Time:    00:36             Narrative:    EXAMINATION:  XR CHEST PA AND LATERAL    CLINICAL HISTORY:  Cough    TECHNIQUE:  PA and lateral views of the chest were performed.    COMPARISON:  February 2018.    FINDINGS:  Cardiac silhouette is normal in size.  Lungs are symmetrically expanded.  No evidence of focal consolidative process, pneumothorax, or significant effusion.  No acute osseous abnormality identified.                                     Medical Decision Making:   ED Management:  Urgent evaluation a 38-year-old female who presents with complaints of cough most consistent with asthma exacerbation.  She is afebrile, nontoxic appearing, hemodynamically stable. Physical exam outlined above and reveals slight HEENT inflammation with adventitious breath sounds as outlined above.  No evidence of acute bacterial infection at this time.  Chest x-ray, DuoNeb and burst of steroids pending.  Will continue the follow.     Update:  Rapid flu is negative. Chest x-ray is unremarkable. Patient has improvement in symptoms with DuoNeb x3 and versus steroids.  Will discharge with 5 day prednisone burst, albuterol, anti-inflammatory.  Will encourage follow-up with primary care provider in the outpatient setting.  She is educated on ED return precautions of worsening symptoms present.  She verbalized understanding is minimal plan.  She is felt stable for discharge.  Other:   I have discussed this case with another health care provider.                      Clinical Impression:   The primary encounter diagnosis was Exacerbation of asthma, unspecified asthma severity, unspecified whether  persistent. A diagnosis of Cough was also pertinent to this visit.                             Cleo Andrews PA-C  02/04/19 0149

## 2019-02-04 NOTE — ED TRIAGE NOTES
"Pt presents with cough onset 1 week ago. Pt reports coughing up yellowish green mucous. Pt reports chest pain with cough. pt has been taking nyquil without relief of symptoms. PMH asthma with ventolin use. Pt reports increased use of rescue inhaler. Pt reports SOB "earlier in the day" and when trying to take a nap. No increased WOB, pt able to speak in full sentences. Pt reports nasal congestion. Pt denies fevers.   "

## 2019-04-23 ENCOUNTER — HOSPITAL ENCOUNTER (EMERGENCY)
Facility: OTHER | Age: 39
Discharge: HOME | End: 2019-04-23
Attending: EMERGENCY MEDICINE
Payer: MEDICAID

## 2019-04-23 VITALS
RESPIRATION RATE: 18 BRPM | SYSTOLIC BLOOD PRESSURE: 137 MMHG | DIASTOLIC BLOOD PRESSURE: 65 MMHG | OXYGEN SATURATION: 100 % | HEART RATE: 75 BPM | HEIGHT: 62 IN | TEMPERATURE: 98 F | BODY MASS INDEX: 22.08 KG/M2 | WEIGHT: 120 LBS

## 2019-04-23 DIAGNOSIS — N12 PYELONEPHRITIS: Primary | ICD-10-CM

## 2019-04-23 LAB
B-HCG UR QL: NEGATIVE
BACTERIA #/AREA URNS HPF: ABNORMAL /HPF
BILIRUB UR QL STRIP: NEGATIVE
CLARITY UR: ABNORMAL
COLOR UR: YELLOW
CTP QC/QA: YES
GLUCOSE UR QL STRIP: NEGATIVE
HGB UR QL STRIP: ABNORMAL
HYALINE CASTS #/AREA URNS LPF: 0 /LPF
KETONES UR QL STRIP: ABNORMAL
LEUKOCYTE ESTERASE UR QL STRIP: ABNORMAL
MICROSCOPIC COMMENT: ABNORMAL
NITRITE UR QL STRIP: POSITIVE
PH UR STRIP: 6 [PH] (ref 5–8)
PROT UR QL STRIP: ABNORMAL
RBC #/AREA URNS HPF: 40 /HPF (ref 0–4)
SP GR UR STRIP: 1.02 (ref 1–1.03)
SQUAMOUS #/AREA URNS HPF: 2 /HPF
URN SPEC COLLECT METH UR: ABNORMAL
UROBILINOGEN UR STRIP-ACNC: 1 EU/DL
WBC #/AREA URNS HPF: >100 /HPF (ref 0–5)

## 2019-04-23 PROCEDURE — 25000003 PHARM REV CODE 250: Performed by: PHYSICIAN ASSISTANT

## 2019-04-23 PROCEDURE — 87186 SC STD MICRODIL/AGAR DIL: CPT

## 2019-04-23 PROCEDURE — 87086 URINE CULTURE/COLONY COUNT: CPT

## 2019-04-23 PROCEDURE — 87077 CULTURE AEROBIC IDENTIFY: CPT

## 2019-04-23 PROCEDURE — 81025 URINE PREGNANCY TEST: CPT | Performed by: EMERGENCY MEDICINE

## 2019-04-23 PROCEDURE — 81000 URINALYSIS NONAUTO W/SCOPE: CPT

## 2019-04-23 PROCEDURE — 99283 EMERGENCY DEPT VISIT LOW MDM: CPT | Mod: 25

## 2019-04-23 PROCEDURE — 87088 URINE BACTERIA CULTURE: CPT

## 2019-04-23 RX ORDER — ACETAMINOPHEN 325 MG/1
650 TABLET ORAL
Status: COMPLETED | OUTPATIENT
Start: 2019-04-23 | End: 2019-04-23

## 2019-04-23 RX ORDER — IBUPROFEN 600 MG/1
600 TABLET ORAL
Status: COMPLETED | OUTPATIENT
Start: 2019-04-23 | End: 2019-04-23

## 2019-04-23 RX ORDER — CEPHALEXIN 500 MG/1
500 CAPSULE ORAL 4 TIMES DAILY
Qty: 40 CAPSULE | Refills: 0 | Status: SHIPPED | OUTPATIENT
Start: 2019-04-23 | End: 2019-05-03

## 2019-04-23 RX ADMIN — ACETAMINOPHEN 650 MG: 325 TABLET ORAL at 03:04

## 2019-04-23 RX ADMIN — IBUPROFEN 600 MG: 600 TABLET ORAL at 03:04

## 2019-04-23 NOTE — ED PROVIDER NOTES
Encounter Date: 2019       History     Chief Complaint   Patient presents with    Dysuria     Pt c/o dysuria, hematuria & pressure while voiding which started Thursday & has progressively worsened. Pt also c/o bilateral lower back pain, denies fever.     Patient is a 38-year-old female who presents to the ED with dysuria.  Patient reports pain and pressure with urinating for the past 5 days.  She also reports developing streaky hematuria today.  She reports bilateral lower back pain and left-sided flank pain. She denies fever, nausea, or emesis.  She denies vaginal discharge. She states she has never had a urinary tract or kidney infection.    The history is provided by the patient.     Review of patient's allergies indicates:  No Known Allergies  Past Medical History:   Diagnosis Date    Asthma     Migraine headache      Past Surgical History:   Procedure Laterality Date     SECTION       No family history on file.  Social History     Tobacco Use    Smoking status: Never Smoker    Smokeless tobacco: Never Used   Substance Use Topics    Alcohol use: No    Drug use: No     Review of Systems   Constitutional: Negative for chills and fever.   HENT: Negative for congestion and sore throat.    Respiratory: Negative for shortness of breath.    Gastrointestinal: Negative for abdominal pain, diarrhea, nausea and vomiting.   Genitourinary: Positive for dysuria, flank pain, hematuria and urgency. Negative for pelvic pain and vaginal discharge.   Musculoskeletal: Positive for back pain.   Skin: Negative for rash.   Neurological: Negative for headaches.       Physical Exam     Initial Vitals [19 1418]   BP Pulse Resp Temp SpO2   115/74 85 18 98.4 °F (36.9 °C) 99 %      MAP       --         Physical Exam    Constitutional: Vital signs are normal. She is cooperative. No distress.   HENT:   Head: Normocephalic and atraumatic.   Eyes: EOM are normal. Pupils are equal, round, and reactive to light.   Neck:  Normal range of motion. Neck supple.   Cardiovascular: Normal rate, regular rhythm and intact distal pulses.   Abdominal: Soft. Bowel sounds are normal. There is no tenderness (No CVA tenderness).   Musculoskeletal: Normal range of motion. She exhibits no edema.   Neurological: She is alert and oriented to person, place, and time. GCS eye subscore is 4. GCS verbal subscore is 5. GCS motor subscore is 6.   Skin: Skin is warm and dry. No rash noted.         ED Course   Procedures  Labs Reviewed   URINALYSIS, REFLEX TO URINE CULTURE - Abnormal; Notable for the following components:       Result Value    Appearance, UA Cloudy (*)     Protein, UA 2+ (*)     Ketones, UA Trace (*)     Occult Blood UA 3+ (*)     Nitrite, UA Positive (*)     Leukocytes, UA 2+ (*)     All other components within normal limits    Narrative:     Preferred Collection Type->Urine, Clean Catch   URINALYSIS MICROSCOPIC - Abnormal; Notable for the following components:    RBC, UA 40 (*)     WBC, UA >100 (*)     Bacteria, UA Many (*)     All other components within normal limits    Narrative:     Preferred Collection Type->Urine, Clean Catch   CULTURE, URINE   POCT URINE PREGNANCY          Imaging Results    None          Medical Decision Making:   Initial Assessment:   Urgent evaluation of a 38 y.o. female presenting to the emergency department complaining of dysuria, hematuria, and flank pain x5 days. Patient is afebrile, nontoxic appearing and hemodynamically stable.  Patient appears well on exam.  She does not have true CVA tenderness but does point to her left flank region when describing her pain. No signs of renal colic.    ED Management:  UPT is negative.  Urinalysis consistent with UTI with 40 RBCs.  Will treat to cover for pyelonephritis.  Urine culture is in process.  Patient will be sent home with Keflex and is advised on symptomatic care.  She is given strict return precautions.  She has no other complaints at this time is stable for  discharge.                      Clinical Impression:     1. Pyelonephritis                               Sajan Mock PA-C  04/23/19 2088

## 2019-04-23 NOTE — ED NOTES
Pt presents to ED via self with c/o dysuria x5 days. Pt reporting dysuria with hematuria and pressure while voiding since 04/18. Pt also reporting bilateral lower back pain that started with urinary s/s. Pt denies emesis, SOB, CP. AAOx4, RR even and unlabored. Will continue to monitor.    Two patient identifiers have been checked and are correct.      Appearance: Pt awake, alert & oriented to person, place & time. Pt in no acute distress at present time. Pt is clean and well groomed with clothes appropriately fastened.   Skin: Skin warm, dry & intact. Color consistent with ethnicity. Mucous membranes moist. No breakdown or brusing noted.   Musculoskeletal: Patient moving all extremities well, no obvious swelling or deformities noted.   Respiratory: Respirations spontaneous, even, and non-labored. Visible chest rise noted. Airway is open and patent. No accessory muscle use noted.   Neurologic: Sensation is intact. Speech is clear and appropriate. Eyes open spontaneously, behavior appropriate to situation, follows commands, facial expression symmetrical, bilateral hand grasp equal and even, purposeful motor response noted.  Cardiac: All peripheral pulses present. No Bilateral lower extremity edema. Cap refill is <3 seconds.  Abdomen: Abdomen soft, non-tender to palpation.   : Pt reports dysuria with hematuria and pressure while voiding.

## 2019-04-26 LAB — BACTERIA UR CULT: NORMAL

## 2020-03-14 ENCOUNTER — HOSPITAL ENCOUNTER (EMERGENCY)
Facility: OTHER | Age: 40
Discharge: HOME | End: 2020-03-14
Attending: EMERGENCY MEDICINE
Payer: MEDICAID

## 2020-03-14 VITALS
OXYGEN SATURATION: 98 % | WEIGHT: 125 LBS | HEIGHT: 62 IN | DIASTOLIC BLOOD PRESSURE: 63 MMHG | RESPIRATION RATE: 16 BRPM | TEMPERATURE: 97 F | SYSTOLIC BLOOD PRESSURE: 130 MMHG | BODY MASS INDEX: 23 KG/M2 | HEART RATE: 75 BPM

## 2020-03-14 DIAGNOSIS — J06.9 VIRAL URI WITH COUGH: Primary | ICD-10-CM

## 2020-03-14 LAB
CTP QC/QA: YES
POC MOLECULAR INFLUENZA A AGN: NEGATIVE
POC MOLECULAR INFLUENZA B AGN: NEGATIVE

## 2020-03-14 PROCEDURE — 99284 EMERGENCY DEPT VISIT MOD MDM: CPT

## 2020-03-14 RX ORDER — GUAIFENESIN AND DEXTROMETHORPHAN HYDROBROMIDE 1200; 60 MG/1; MG/1
1 TABLET, EXTENDED RELEASE ORAL 2 TIMES DAILY PRN
Qty: 30 TABLET | Refills: 0 | Status: SHIPPED | OUTPATIENT
Start: 2020-03-14 | End: 2020-03-24

## 2020-03-14 RX ORDER — TIZANIDINE HYDROCHLORIDE 4 MG/1
4 CAPSULE, GELATIN COATED ORAL
COMMUNITY
End: 2020-12-07

## 2020-03-14 RX ORDER — DICLOFENAC SODIUM 75 MG/1
75 TABLET, DELAYED RELEASE ORAL
COMMUNITY
End: 2020-03-14 | Stop reason: ALTCHOICE

## 2020-03-14 RX ORDER — ALBUTEROL SULFATE 90 UG/1
1-2 AEROSOL, METERED RESPIRATORY (INHALATION) EVERY 6 HOURS PRN
Qty: 6.7 G | Refills: 0 | Status: SHIPPED | OUTPATIENT
Start: 2020-03-14 | End: 2024-01-04 | Stop reason: SDUPTHER

## 2020-03-14 RX ORDER — CETIRIZINE HYDROCHLORIDE 10 MG/1
10 TABLET ORAL DAILY
Qty: 30 TABLET | Refills: 0 | Status: SHIPPED | OUTPATIENT
Start: 2020-03-14 | End: 2021-03-14

## 2020-03-14 RX ORDER — IBUPROFEN 600 MG/1
600 TABLET ORAL EVERY 6 HOURS PRN
Qty: 20 TABLET | Refills: 0 | OUTPATIENT
Start: 2020-03-14 | End: 2020-10-21

## 2020-03-14 RX ORDER — MONTELUKAST SODIUM 10 MG/1
10 TABLET ORAL
COMMUNITY
End: 2024-01-04

## 2020-03-14 RX ORDER — FLUTICASONE PROPIONATE 50 MCG
1 SPRAY, SUSPENSION (ML) NASAL 2 TIMES DAILY PRN
Qty: 15 G | Refills: 0 | OUTPATIENT
Start: 2020-03-14 | End: 2024-01-04

## 2020-03-14 NOTE — ED TRIAGE NOTES
Reports to ED with c/o sore throat, night sweats, productive cough with yellow sputum and bilateral otalgia since yesterday. Reports hx of asthma. Denies know fever/chills.    Two patient identifiers have been checked and are correct.      Appearance: Pt awake, alert & oriented to person, place & time. Pt in no acute distress at present time. Pt is clean and well groomed with clothes appropriately fastened.   Skin: Skin warm, dry & intact. Color consistent with ethnicity. Mucous membranes moist. No breakdown or brusing noted.   Musculoskeletal: Patient moving all extremities well, no obvious swelling or deformities noted.   Respiratory: Respirations spontaneous, even, and non-labored. Visible chest rise noted. Airway is open and patent. No accessory muscle use noted.   Neurologic: Sensation is intact. Speech is clear and appropriate. Eyes open spontaneously, behavior appropriate to situation, follows commands, facial expression symmetrical, bilateral hand grasp equal and even, purposeful motor response noted.  Cardiac: All peripheral pulses present. No Bilateral lower extremity edema. Cap refill is <3 seconds.  Abdomen: Abdomen soft, non-tender to palpation.   : Pt reports no dysuria or hematuria.

## 2020-03-14 NOTE — ED PROVIDER NOTES
Encounter Date: 3/14/2020       History     Chief Complaint   Patient presents with    Sore Throat     Reports sore throat, productive cough with yellow sputum, night sweats since last night. Denies known fever/chills. Hx of asthma.    Cough     Sequeasia Louis 39 y.o. PMH of asthma and migraines presented to the ED with c/o flu-like symptoms that began yesterday.  She complains of sore throat, cough, chills, increased wheezing and occasional shortness of breath.  She does state that she has a history of asthma.  She states that she has tried her inhaler with no significant improvement symptoms.  She denies any chest pain, vomiting, diarrhea or known sick contacts.     The history is provided by the patient.     Review of patient's allergies indicates:  No Known Allergies  Past Medical History:   Diagnosis Date    Asthma     Migraine headache      Past Surgical History:   Procedure Laterality Date     SECTION       History reviewed. No pertinent family history.  Social History     Tobacco Use    Smoking status: Never Smoker    Smokeless tobacco: Never Used   Substance Use Topics    Alcohol use: No    Drug use: No     Review of Systems   Constitutional: Positive for chills. Negative for fever.   HENT: Positive for congestion, postnasal drip, sinus pressure, sinus pain and sore throat.    Respiratory: Positive for cough, shortness of breath and wheezing.    Cardiovascular: Negative for chest pain.   Gastrointestinal: Negative for nausea and vomiting.   Genitourinary: Negative for dysuria.   Musculoskeletal: Positive for arthralgias. Negative for back pain, neck pain and neck stiffness.   Skin: Negative for rash.   Neurological: Positive for headaches. Negative for dizziness, weakness and light-headedness.   Hematological: Does not bruise/bleed easily.       Physical Exam     Initial Vitals [20 1240]   BP Pulse Resp Temp SpO2   129/88 97 18 97.4 °F (36.3 °C) 95 %      MAP       --          Physical Exam    Nursing note and vitals reviewed.  Constitutional: Vital signs are normal. She appears well-developed and well-nourished. She is cooperative.  Non-toxic appearance. She does not appear ill. No distress.   HENT:   Head: Normocephalic and atraumatic.   Eyes: Conjunctivae and lids are normal.   Neck: Neck supple. No neck rigidity.   Cardiovascular: Normal rate and regular rhythm.   Pulmonary/Chest: Breath sounds normal. No respiratory distress. She has no wheezes. She has no rhonchi.   Abdominal: Soft. Normal appearance and bowel sounds are normal. There is no tenderness. There is no rigidity and no guarding.   Musculoskeletal: Normal range of motion.   Neurological: She is alert and oriented to person, place, and time. GCS eye subscore is 4. GCS verbal subscore is 5. GCS motor subscore is 6.   Skin: Skin is warm, dry and intact. No rash noted.   Psychiatric: She has a normal mood and affect. Her speech is normal and behavior is normal. Thought content normal.         ED Course   Procedures  Labs Reviewed   POCT INFLUENZA A/B MOLECULAR          Imaging Results    None            Sequeasia Louis 39 y.o. PMH of asthma and migraines presented to the ED with c/o flu-like symptoms that began yesterday.  She complains of sore throat, cough, chills, increased wheezing and occasional shortness of breath.  She does state that she has a history of asthma.  She states that she has tried her inhaler with no significant improvement symptoms.  She denies any chest pain, vomiting, diarrhea or known sick contacts.  ROS positive for URI symptoms.  Physical exam reveals patient that appears ill but nontoxic. Lungs clear and free of wheeze. Heart regular rate and rhythm. Abdomen is soft and nontender with normal bowel sounds. FROM of neck, no lymphadenopathy and FROM of all extremities with strength 5/5 bilaterally. Skin free of rash, pallor and diaphoresis.    DDX: influenza, viral URI with cough, bronchitis,  pneumonia    ED management: Flu swab negative. No further labs or imaging warranted at this time as patient remains well appearing, afebrile and in no distress at this time.  At this time patient does not meet CDC criteria to warrant further evaluation for COVID19 however discuss similar URI symptoms and that she should self quarantine for 2 weeks. Instructed patient on fever and symptom control.      Impression/Plan: The encounter diagnosis was Viral URI with cough. Discharged with ProAir, Flonase, Motrin, Zyrtec and Mucinex DM. Patient will follow up with Primary.  Patient cautioned on when to return to ED.  Pt. Understands and agrees with current treatment plan                                                      Clinical Impression:   No diagnosis found.                             LEON Sampson  03/14/20 1917

## 2020-10-21 ENCOUNTER — HOSPITAL ENCOUNTER (EMERGENCY)
Facility: OTHER | Age: 40
Discharge: HOME | End: 2020-10-21
Attending: EMERGENCY MEDICINE
Payer: MEDICAID

## 2020-10-21 VITALS
RESPIRATION RATE: 18 BRPM | TEMPERATURE: 98 F | OXYGEN SATURATION: 100 % | HEIGHT: 62 IN | DIASTOLIC BLOOD PRESSURE: 76 MMHG | HEART RATE: 85 BPM | BODY MASS INDEX: 23 KG/M2 | WEIGHT: 125 LBS | SYSTOLIC BLOOD PRESSURE: 110 MMHG

## 2020-10-21 DIAGNOSIS — M79.641 RIGHT HAND PAIN: Primary | ICD-10-CM

## 2020-10-21 LAB
B-HCG UR QL: NEGATIVE
CTP QC/QA: YES

## 2020-10-21 PROCEDURE — 99283 EMERGENCY DEPT VISIT LOW MDM: CPT | Mod: 25

## 2020-10-21 PROCEDURE — 25000003 PHARM REV CODE 250: Performed by: PHYSICIAN ASSISTANT

## 2020-10-21 PROCEDURE — 81025 URINE PREGNANCY TEST: CPT | Performed by: EMERGENCY MEDICINE

## 2020-10-21 PROCEDURE — 29125 APPL SHORT ARM SPLINT STATIC: CPT

## 2020-10-21 RX ORDER — KETOROLAC TROMETHAMINE 10 MG/1
10 TABLET, FILM COATED ORAL
Status: COMPLETED | OUTPATIENT
Start: 2020-10-21 | End: 2020-10-21

## 2020-10-21 RX ORDER — KETOROLAC TROMETHAMINE 10 MG/1
10 TABLET, FILM COATED ORAL EVERY 6 HOURS
Qty: 12 TABLET | Refills: 0 | Status: SHIPPED | OUTPATIENT
Start: 2020-10-21 | End: 2020-10-24

## 2020-10-21 RX ADMIN — KETOROLAC TROMETHAMINE 10 MG: 10 TABLET, FILM COATED ORAL at 01:10

## 2020-10-21 NOTE — Clinical Note
"Sequeasia "Sequeasia" Heriberto was seen and treated in our emergency department on 10/21/2020.  She may return with limitations on 10/23/2020.  Light duty until cleared by follow-up physician     Sincerely,      LENO Sampson    "

## 2020-10-21 NOTE — ED TRIAGE NOTES
Pt presents to ED for evaluation of right hand and wrist pain that started approximately 1 week ago. She reports that the pain is intermittent and is a 10 out of 10 at its worst. She denies any trauma. She is AAO x 3, answers questions appropriately

## 2020-10-21 NOTE — ED PROVIDER NOTES
Encounter Date: 10/21/2020       History     Chief Complaint   Patient presents with    Hand Pain     Reports right hand and wrist pain x 1 week, denies trauma     32-year-old female presents to the ED for evaluation of right wrist and hand pain for approximately 1 week.  Patient is Kaila denies any trauma however upon further thought states that she did have an IV placed on this hand few days prior to this.  She does report remembering some pain at the IV site however denies any initial pain after this.  She does also report that she does her children stair and twisted the hair often and states that this motion does seem to exacerbate the pain.  She states pain is worse with certain movements of the hand and palpation.  She has tried volteran gel and Motrin with no significant improvement.  She denies any numbness, weakness, fever, chills or pain radiation.    The history is provided by the patient.     Review of patient's allergies indicates:  No Known Allergies  Past Medical History:   Diagnosis Date    Asthma     Migraine headache      Past Surgical History:   Procedure Laterality Date     SECTION       No family history on file.  Social History     Tobacco Use    Smoking status: Never Smoker    Smokeless tobacco: Never Used   Substance Use Topics    Alcohol use: No    Drug use: No     Review of Systems   Constitutional: Negative for chills and fever.   Cardiovascular: Negative for chest pain and palpitations.   Gastrointestinal: Negative for nausea and vomiting.   Musculoskeletal: Positive for arthralgias. Negative for back pain, joint swelling and myalgias.   Skin: Negative for color change, rash and wound.   Allergic/Immunologic: Negative for immunocompromised state.   Neurological: Negative for weakness and numbness.   Hematological: Does not bruise/bleed easily.       Physical Exam     Initial Vitals [10/21/20 1257]   BP Pulse Resp Temp SpO2   (!) 142/82 90 16 98 °F (36.7 °C) 100 %      MAP        --         Physical Exam    Nursing note and vitals reviewed.  Constitutional: Vital signs are normal. She appears well-developed and well-nourished. She is cooperative.  Non-toxic appearance. She does not appear ill. No distress.   HENT:   Head: Normocephalic and atraumatic.   Eyes: Conjunctivae and lids are normal.   Neck: Neck supple. No neck rigidity.   Cardiovascular: Normal rate.   Pulmonary/Chest: No respiratory distress.   Abdominal: Soft. Normal appearance. There is no rigidity.   Musculoskeletal: Normal range of motion.        Right hand: She exhibits tenderness. She exhibits normal range of motion, normal capillary refill, no deformity, no laceration and no swelling. Normal sensation noted.        Hands:       Comments: Generalized tenderness to palpation of the right hand although no edema or ecchymosis or obvious bony deformity.  Pain with extension of the right thumb and on Finkelstein removed for.  Tenderness to palpation of the medial wrist.  She exhibits full range of motion   Neurological: She is alert and oriented to person, place, and time. She has normal strength. GCS eye subscore is 4. GCS verbal subscore is 5. GCS motor subscore is 6.   Skin: Skin is warm, dry and intact. No rash and no abscess noted. No erythema.   Psychiatric: She has a normal mood and affect. Her speech is normal and behavior is normal. Thought content normal.         ED Course   Procedures  Labs Reviewed   POCT URINE PREGNANCY          Imaging Results          X-Ray Hand 3 view Right (Final result)  Result time 10/21/20 14:01:55    Final result by Deric Leggett MD (10/21/20 14:01:55)                 Impression:      No acute findings.      Electronically signed by: Deric Leggett MD  Date:    10/21/2020  Time:    14:01             Narrative:    EXAMINATION:  XR HAND COMPLETE 3 VIEW RIGHT    CLINICAL HISTORY:  pain;    TECHNIQUE:  PA, lateral, and oblique views of the right hand were  performed.    COMPARISON:  11/02/2018    FINDINGS:  No evidence of an acute fracture or dislocation.  Healed fracture of the base of the 1st distal phalanx.  Joint spaces appear relatively well maintained.  No radiopaque foreign bodies.                            MDM  Number of Diagnoses or Management Options  Right hand pain:      Amount and/or Complexity of Data Reviewed  Tests in the radiology section of CPT®: ordered and reviewed          Anay Louis 40 y.o. presented to the ED with c/o right hand pain.  Physical exam reveals patient well appearing in no distress at rest. Generalized tenderness to palpation of the right hand although no edema or ecchymosis or obvious bony deformity.  Pain with extension of the right thumb and on Finkelstein removed for.  Tenderness to palpation of the medial wrist.  She exhibits full range of motion    DDX: fracture, sprain, dislocation    ED management: x-ray showing no acute bony process; toradol and ice in the ED. Placed patient in dynamic thumb spica.  As she has had previous injury of this right hand she was encouraged to get back in with her orthopedic (Dr. Newsome).  No further workup at this time as low suspicion of acute infectious process or additional injury in symptoms are most consistent with MSK.    Impression/Plan: The encounter diagnosis was Right hand pain.  Discharged with toradol Patient will follow up with Primary.  Patient cautioned on when to return to ED.  Pt. Understands and agrees with current treatment plan                                  Clinical Impression:     ICD-10-CM ICD-9-CM   1. Right hand pain  M79.641 729.5                          ED Disposition Condition    Discharge Stable        ED Prescriptions     Medication Sig Dispense Start Date End Date Auth. Provider    ketorolac (TORADOL) 10 mg tablet Take 1 tablet (10 mg total) by mouth every 6 (six) hours. for 3 days 12 tablet 10/21/2020 10/24/2020 LENO Sampson        Follow-up  Information     Follow up With Specialties Details Why Contact Info    Sunny Newsome MD Orthopedic Surgery Schedule an appointment as soon as possible for a visit   [address]  [address]  [facility] [address]  [phone]      [facility] Emergency Medicine  If symptoms worsen [address]  [facility] [id number] [phone]                                       LENO Sampson  10/21/20 2049

## 2020-12-07 ENCOUNTER — HOSPITAL ENCOUNTER (EMERGENCY)
Facility: OTHER | Age: 40
Discharge: HOME | End: 2020-12-07
Attending: EMERGENCY MEDICINE
Payer: MEDICAID

## 2020-12-07 VITALS
WEIGHT: 120 LBS | SYSTOLIC BLOOD PRESSURE: 111 MMHG | BODY MASS INDEX: 22.08 KG/M2 | OXYGEN SATURATION: 100 % | HEIGHT: 62 IN | HEART RATE: 71 BPM | RESPIRATION RATE: 18 BRPM | DIASTOLIC BLOOD PRESSURE: 74 MMHG | TEMPERATURE: 98 F

## 2020-12-07 DIAGNOSIS — M54.12 CERVICAL RADICULOPATHY: ICD-10-CM

## 2020-12-07 DIAGNOSIS — M54.2 NECK PAIN ON LEFT SIDE: Primary | ICD-10-CM

## 2020-12-07 DIAGNOSIS — M79.603 ARM PAIN: ICD-10-CM

## 2020-12-07 PROCEDURE — 96372 THER/PROPH/DIAG INJ SC/IM: CPT

## 2020-12-07 PROCEDURE — 93010 ELECTROCARDIOGRAM REPORT: CPT | Mod: ,,, | Performed by: INTERNAL MEDICINE

## 2020-12-07 PROCEDURE — 25000003 PHARM REV CODE 250: Performed by: EMERGENCY MEDICINE

## 2020-12-07 PROCEDURE — 93010 EKG 12-LEAD: ICD-10-PCS | Mod: ,,, | Performed by: INTERNAL MEDICINE

## 2020-12-07 PROCEDURE — 99284 EMERGENCY DEPT VISIT MOD MDM: CPT | Mod: 25

## 2020-12-07 PROCEDURE — 93005 ELECTROCARDIOGRAM TRACING: CPT

## 2020-12-07 PROCEDURE — 63600175 PHARM REV CODE 636 W HCPCS: Performed by: EMERGENCY MEDICINE

## 2020-12-07 RX ORDER — CYCLOBENZAPRINE HCL 10 MG
10 TABLET ORAL 3 TIMES DAILY PRN
Qty: 15 TABLET | Refills: 0 | Status: SHIPPED | OUTPATIENT
Start: 2020-12-07 | End: 2020-12-12

## 2020-12-07 RX ORDER — KETOROLAC TROMETHAMINE 30 MG/ML
30 INJECTION, SOLUTION INTRAMUSCULAR; INTRAVENOUS
Status: COMPLETED | OUTPATIENT
Start: 2020-12-07 | End: 2020-12-07

## 2020-12-07 RX ORDER — NAPROXEN 500 MG/1
500 TABLET ORAL 2 TIMES DAILY WITH MEALS
Qty: 14 TABLET | Refills: 0 | OUTPATIENT
Start: 2020-12-07 | End: 2022-12-29

## 2020-12-07 RX ORDER — ORPHENADRINE CITRATE 30 MG/ML
60 INJECTION INTRAMUSCULAR; INTRAVENOUS
Status: COMPLETED | OUTPATIENT
Start: 2020-12-07 | End: 2020-12-07

## 2020-12-07 RX ORDER — OXYCODONE AND ACETAMINOPHEN 5; 325 MG/1; MG/1
1 TABLET ORAL
Status: COMPLETED | OUTPATIENT
Start: 2020-12-07 | End: 2020-12-07

## 2020-12-07 RX ADMIN — ORPHENADRINE CITRATE 60 MG: 30 INJECTION INTRAMUSCULAR; INTRAVENOUS at 07:12

## 2020-12-07 RX ADMIN — OXYCODONE HYDROCHLORIDE AND ACETAMINOPHEN 1 TABLET: 5; 325 TABLET ORAL at 07:12

## 2020-12-07 RX ADMIN — KETOROLAC TROMETHAMINE 30 MG: 30 INJECTION, SOLUTION INTRAMUSCULAR; INTRAVENOUS at 07:12

## 2020-12-07 NOTE — ED PROVIDER NOTES
Encounter Date: 2020    SCRIBE #1 NOTE: Lionel ALEXANDER, am scribing for, and in the presence of, Dr. Drummond.       History     Chief Complaint   Patient presents with    Neck Pain     Shooting pain dfrom left ear to left side of neck since thanksgi.  Patient states also experencing left arm tingling.     Time seen by provider: 7:02 AM    This is a 40 y.o. female, with a hx of cervical radiculopathy, who presents with complaint of left sided neck pain for about the last 2 weeks. Pt describes the pain as a throbbing and shooting sensation that radiates down to her left arm. She states the current pain does not feel similar to pain she has experienced in the past. She states she last received an injection 10/2 and was prescribed medicinal marijuana. Pt reports associated numbness in tingling in her left arm. She denies back pain.    The history is provided by the patient and medical records.     Review of patient's allergies indicates:  No Known Allergies  Past Medical History:   Diagnosis Date    Asthma     Migraine headache      Past Surgical History:   Procedure Laterality Date     SECTION       No family history on file.  Social History     Tobacco Use    Smoking status: Never Smoker    Smokeless tobacco: Never Used   Substance Use Topics    Alcohol use: No    Drug use: No     Review of Systems   Constitutional: Negative for chills and fever.   HENT: Negative for congestion, rhinorrhea and sore throat.    Eyes: Negative for visual disturbance.   Respiratory: Negative for cough and shortness of breath.    Cardiovascular: Negative for chest pain.   Gastrointestinal: Negative for abdominal pain, diarrhea, nausea and vomiting.   Genitourinary: Negative for dysuria.   Musculoskeletal: Positive for neck pain. Negative for back pain.   Skin: Negative for rash.   Neurological: Positive for numbness (left arm). Negative for dizziness, weakness and light-headedness.   Psychiatric/Behavioral:  Negative for confusion.       Physical Exam     Initial Vitals [12/07/20 0656]   BP Pulse Resp Temp SpO2   137/79 85 20 97.9 °F (36.6 °C) 100 %      MAP       --         Physical Exam    Nursing note and vitals reviewed.  Constitutional: She appears well-developed and well-nourished. She is not diaphoretic. No distress.   HENT:   Head: Normocephalic and atraumatic.   Eyes: Conjunctivae and EOM are normal. Pupils are equal, round, and reactive to light. No scleral icterus.   Neck: Normal range of motion. Neck supple.   Cardiovascular: Normal rate, regular rhythm and normal heart sounds. Exam reveals no gallop and no friction rub.    No murmur heard.  Pulmonary/Chest: Breath sounds normal. No respiratory distress. She has no wheezes. She has no rhonchi. She has no rales.   Abdominal: Soft. Bowel sounds are normal. She exhibits no distension. There is no abdominal tenderness. There is no rebound and no guarding.   Musculoskeletal: Normal range of motion. Tenderness present. No edema.      Comments: Tenderness along lateral aspect of left neck extending into upper back. No swelling.   Neurological: She is alert and oriented to person, place, and time.   Sensation intact. Normal strength of left upper extremities.   Skin: Skin is warm and dry.         ED Course   Procedures  Labs Reviewed - No data to display  EKG Readings: (Independently Interpreted)   Normal Sinus Rhythm at a rate of 67 with sinus arrhythmia. No ST changes. Normal intervals.     ECG Results          EKG 12-lead (Final result)  Result time 12/07/20 19:04:54    Final result by Interface, Lab In [facility] (12/07/20 19:04:54)                 Narrative:    Test Reason : M79.603,    Vent. Rate : 067 BPM     Atrial Rate : 067 BPM     P-R Int : 122 ms          QRS Dur : 084 ms      QT Int : 388 ms       P-R-T Axes : 075 087 075 degrees     QTc Int : 409 ms    Normal sinus rhythm with sinus arrhythmia  Normal ECG    Confirmed by Mitchel Blount MD ([id number]) on  2020 7:04:44 PM    Referred By: LILIANA   SELF           Confirmed By:Mitchel Blount MD                             EKG 12-LEAD (Final result)  Result time 20 14:16:16    Final result by Unknown User (20 14:16:16)                                Imaging Results    None          Medical Decision Making:   History:   Old Medical Records: I decided to obtain old medical records.  Initial Assessment:   40 year old female, with chronic neck pain, presents with radicular pain on left side. No injury or apparent trigger. Plan muscle relaxer, anti inflammatory, and follow up with pain management.  Independently Interpreted Test(s):   I have ordered and independently interpreted EKG Reading(s) - see prior notes  Clinical Tests:   Lab Tests: Ordered and Reviewed  Medical Tests: Ordered and Reviewed            Scribe Attestation:   Scribe #1: I performed the above scribed service and the documentation accurately describes the services I performed. I attest to the accuracy of the note.    Attending Attestation:           Physician Attestation for Scribe:  Physician Attestation Statement for Scribe #1: I, Dr. Drummond, reviewed documentation, as scribed by Lionel Price in my presence, and it is both accurate and complete.                           Clinical Impression:       ICD-10-CM ICD-9-CM   1. Neck pain on left side  M54.2 723.1   2. Arm pain  M79.603 729.5   3. Cervical radiculopathy  M54.12 723.4                          ED Disposition Condition    Discharge Stable        ED Prescriptions     Medication Sig Dispense Start Date End Date Auth. Provider    cyclobenzaprine (FLEXERIL) 10 MG tablet () Take 1 tablet (10 mg total) by mouth 3 (three) times daily as needed for Muscle spasms. 15 tablet 2020 Kandace Drummond MD    naproxen (NAPROSYN) 500 MG tablet Take 1 tablet (500 mg total) by mouth 2 (two) times daily with meals. 14 tablet 2020  Kandace Drummond MD        Follow-up  Information     Follow up With Specialties Details Why Contact Info    Mayelin Leonard MD Family Medicine   [address] [facility]  [address]  [place] [facility] [address]  [phone]                                         Kandace Drummond MD  12/17/20 1156

## 2020-12-07 NOTE — DISCHARGE INSTRUCTIONS
Followup with your primary doctor as scheduled next week and with your pain management specialists as scheduled next month.  Apply warm compresses.  Take the medication as prescribed.

## 2020-12-07 NOTE — ED NOTES
Patient Identifiers for Anay Louis checked and correct  Pt reports posterior neck pain/discomfort for 3 weeks.  LOC: The patient is awake, alert and aware of environment with an appropriate affect, the patient is oriented x 3 and speaking appropriate.  APPEARANCE: Patient resting comfortably and in no acute distress, patient is clean and well groomed, patient's clothing is properly fastened.  SKIN: The skin is warm and dry, patient has normal skin turgor and moist mucus membranes,no rashes or lesions.Skin Intact , No Breakdown Noted  Musculoskeletal :  Normal range of motion noted. Moves all extremeties well, + swelling or tenderness noted to posterior neck  RESPIRATORY: Airway is open and patent, respirations are spontaneous, patient has a normal effort and rate.  CARDIAC: Patient has a normal rate and rhythm, no periphreal edema noted, capillary refill < 3 seconds.   ABDOMEN: Soft and non tender to palpation, no distention noted.   PULSES: 2+  And symmetrical in all extremeties  NEUROLOGIC: PERRL, facial expression is symmetrical, patient moving all extremities, normal sensation in all extremities when touched with a finger.The patient is awake, alert and cooperative with a calm affect, patient is aware of environment.    Will continue to monitor

## 2020-12-31 ENCOUNTER — HOSPITAL ENCOUNTER (EMERGENCY)
Facility: OTHER | Age: 40
Discharge: HOME | End: 2020-12-31
Attending: EMERGENCY MEDICINE
Payer: MEDICAID

## 2020-12-31 VITALS
OXYGEN SATURATION: 99 % | HEART RATE: 82 BPM | WEIGHT: 120 LBS | SYSTOLIC BLOOD PRESSURE: 127 MMHG | DIASTOLIC BLOOD PRESSURE: 86 MMHG | BODY MASS INDEX: 22.08 KG/M2 | TEMPERATURE: 98 F | HEIGHT: 62 IN | RESPIRATION RATE: 18 BRPM

## 2020-12-31 DIAGNOSIS — R31.9 HEMATURIA, UNSPECIFIED TYPE: ICD-10-CM

## 2020-12-31 DIAGNOSIS — R30.0 DYSURIA: Primary | ICD-10-CM

## 2020-12-31 LAB
B-HCG UR QL: NEGATIVE
BILIRUB UR QL STRIP: NEGATIVE
CLARITY UR: CLEAR
COLOR UR: YELLOW
CTP QC/QA: YES
GLUCOSE UR QL STRIP: NEGATIVE
HGB UR QL STRIP: ABNORMAL
KETONES UR QL STRIP: ABNORMAL
LEUKOCYTE ESTERASE UR QL STRIP: NEGATIVE
NITRITE UR QL STRIP: NEGATIVE
PH UR STRIP: 7 [PH] (ref 5–8)
PROT UR QL STRIP: NEGATIVE
SP GR UR STRIP: 1.01 (ref 1–1.03)
URN SPEC COLLECT METH UR: ABNORMAL
UROBILINOGEN UR STRIP-ACNC: 1 EU/DL

## 2020-12-31 PROCEDURE — 81025 URINE PREGNANCY TEST: CPT | Performed by: EMERGENCY MEDICINE

## 2020-12-31 PROCEDURE — 81003 URINALYSIS AUTO W/O SCOPE: CPT

## 2020-12-31 PROCEDURE — 99282 EMERGENCY DEPT VISIT SF MDM: CPT | Mod: 25

## 2021-08-03 ENCOUNTER — HOSPITAL ENCOUNTER (EMERGENCY)
Facility: OTHER | Age: 41
Discharge: HOME | End: 2021-08-03
Attending: EMERGENCY MEDICINE
Payer: MEDICAID

## 2021-08-03 VITALS
SYSTOLIC BLOOD PRESSURE: 124 MMHG | DIASTOLIC BLOOD PRESSURE: 72 MMHG | WEIGHT: 121 LBS | HEIGHT: 62 IN | BODY MASS INDEX: 22.26 KG/M2 | TEMPERATURE: 98 F | HEART RATE: 70 BPM | OXYGEN SATURATION: 99 % | RESPIRATION RATE: 16 BRPM

## 2021-08-03 DIAGNOSIS — I80.8 THROMBOPHLEBITIS ARM: Primary | ICD-10-CM

## 2021-08-03 DIAGNOSIS — M79.601 RIGHT ARM PAIN: ICD-10-CM

## 2021-08-03 LAB
B-HCG UR QL: NEGATIVE
CTP QC/QA: YES

## 2021-08-03 PROCEDURE — 99283 EMERGENCY DEPT VISIT LOW MDM: CPT | Mod: 25

## 2021-08-03 PROCEDURE — 25000003 PHARM REV CODE 250: Performed by: PHYSICIAN ASSISTANT

## 2021-08-03 PROCEDURE — 81025 URINE PREGNANCY TEST: CPT | Performed by: PHYSICIAN ASSISTANT

## 2021-08-03 RX ORDER — IBUPROFEN 600 MG/1
600 TABLET ORAL
Status: COMPLETED | OUTPATIENT
Start: 2021-08-03 | End: 2021-08-03

## 2021-08-03 RX ORDER — ACETAMINOPHEN 325 MG/1
650 TABLET ORAL
Status: COMPLETED | OUTPATIENT
Start: 2021-08-03 | End: 2021-08-03

## 2021-08-03 RX ORDER — IBUPROFEN 600 MG/1
600 TABLET ORAL EVERY 6 HOURS PRN
Qty: 12 TABLET | Refills: 0 | OUTPATIENT
Start: 2021-08-03 | End: 2022-12-29

## 2021-08-03 RX ADMIN — IBUPROFEN 600 MG: 600 TABLET, FILM COATED ORAL at 07:08

## 2021-08-03 RX ADMIN — ACETAMINOPHEN 650 MG: 325 TABLET ORAL at 07:08

## 2021-08-06 ENCOUNTER — OCCUPATIONAL HEALTH (OUTPATIENT)
Dept: URGENT CARE | Facility: CLINIC | Age: 41
End: 2021-08-06

## 2021-08-06 DIAGNOSIS — Z02.83 ENCOUNTER FOR DRUG SCREENING: Primary | ICD-10-CM

## 2021-08-06 PROCEDURE — 80305 OOH NON-DOT DRUG SCREEN: ICD-10-PCS | Mod: S$GLB,,, | Performed by: PREVENTIVE MEDICINE

## 2021-08-06 PROCEDURE — 80305 DRUG TEST PRSMV DIR OPT OBS: CPT | Mod: S$GLB,,, | Performed by: PREVENTIVE MEDICINE

## 2022-01-21 ENCOUNTER — HOSPITAL ENCOUNTER (EMERGENCY)
Facility: OTHER | Age: 42
Discharge: HOME | End: 2022-01-21
Attending: EMERGENCY MEDICINE
Payer: MEDICAID

## 2022-01-21 VITALS
OXYGEN SATURATION: 98 % | RESPIRATION RATE: 16 BRPM | BODY MASS INDEX: 23 KG/M2 | SYSTOLIC BLOOD PRESSURE: 125 MMHG | HEART RATE: 92 BPM | HEIGHT: 62 IN | DIASTOLIC BLOOD PRESSURE: 72 MMHG | WEIGHT: 125 LBS | TEMPERATURE: 98 F

## 2022-01-21 DIAGNOSIS — B00.9 HERPES: Primary | ICD-10-CM

## 2022-01-21 PROCEDURE — 87529 HSV DNA AMP PROBE: CPT | Performed by: EMERGENCY MEDICINE

## 2022-01-21 PROCEDURE — 99283 EMERGENCY DEPT VISIT LOW MDM: CPT | Mod: 25

## 2022-01-21 RX ORDER — VALACYCLOVIR HYDROCHLORIDE 1 G/1
1000 TABLET, FILM COATED ORAL 2 TIMES DAILY
Qty: 20 TABLET | Refills: 0 | OUTPATIENT
Start: 2022-01-21 | End: 2022-12-29

## 2022-01-21 NOTE — ED PROVIDER NOTES
Encounter Date: 2022    SCRIBE #1 NOTE: I, Arely Pires, am scribing for, and in the presence of, Jose Cruz Hunter MD.       History     Chief Complaint   Patient presents with    Rash     Patient states that she has been experiencing a rash on her buttocks since yesterday that is red, itchy, and painful.     Time seen by provider: 8:20 AM    This is a 41 y.o. female who presents with complaint of rash to the buttocks x 1 day. She states that the rash is painful and itchy. She denies any fever, chills or N/V. She notes that she is a student. She denies any smoking, drinking or drug use. This is the extent of the patient's complaints at this time.    The history is provided by the patient.     Review of patient's allergies indicates:  No Known Allergies  Past Medical History:   Diagnosis Date    Asthma     Migraine headache      Past Surgical History:   Procedure Laterality Date     SECTION       No family history on file.  Social History     Tobacco Use    Smoking status: Never Smoker    Smokeless tobacco: Never Used   Substance Use Topics    Alcohol use: No    Drug use: No     Review of Systems   Constitutional: Negative for chills and fever.   HENT: Negative for sore throat.    Respiratory: Negative for shortness of breath.    Cardiovascular: Negative for chest pain.   Gastrointestinal: Negative for nausea and vomiting.   Genitourinary: Negative for dysuria.   Musculoskeletal: Negative for back pain.   Skin: Positive for rash (buttock).   Neurological: Negative for weakness.   Hematological: Does not bruise/bleed easily.       Physical Exam     Initial Vitals [22 0721]   BP Pulse Resp Temp SpO2   (!) 140/76 95 16 98.3 °F (36.8 °C) 99 %      MAP       --         Physical Exam    Nursing note and vitals reviewed.  Constitutional: She appears well-developed and well-nourished. She is not diaphoretic. No distress.   HENT:   Head: Normocephalic and atraumatic.   Eyes: EOM are normal.    Neck:   Normal range of motion.  Pulmonary/Chest: No respiratory distress.   Musculoskeletal:         General: Normal range of motion.      Cervical back: Normal range of motion.     Neurological: She is alert and oriented to person, place, and time.   Skin: Skin is dry.   Left buttock: Cluster of vesicular lesions. Concerning for herpes. No involvement of the rectum. No evidence of cellulitis or abscess.         ED Course   Procedures  Labs Reviewed   HERPES SIMPLEX VIRUS (HSV) TYPE 1 & 2 DNA BY PCR          Imaging Results    None          Medications - No data to display  Medical Decision Making:   History:   Old Medical Records: I decided to obtain old medical records.  Clinical Tests:   Lab Tests: Ordered and Reviewed          Scribe Attestation:   Scribe #1: I performed the above scribed service and the documentation accurately describes the services I performed. I attest to the accuracy of the note.    Attending Attestation:             Attending ED Notes:   Emergent evaluation a 41-year-old female with complaint of rash to the buttock.  Patient is afebrile, nontoxic-appearing stable vital signs.  No clinical evidence of cellulitis or abscess formation.  Rash is consistent with herpes infection.  The patient is extensively counseled on their diagnosis and treatment including all diagnostic, laboratory and physical exam findings.  The patient is discharged good condition and directed follow-up with their PCP in the next 24-48 hours.            Physician Attestation for Scribe: I, Jose Cruz Chang, reviewed documentation as scribed in my presence, which is both accurate and complete.    Clinical Impression:   Final diagnoses:  [B00.9] Herpes (Primary)          ED Disposition Condition    Discharge Stable        ED Prescriptions     Medication Sig Dispense Start Date End Date Auth. Provider    valACYclovir (VALTREX) 1000 MG tablet Take 1 tablet (1,000 mg total) by mouth 2 (two) times a day. for 10 days 20  tablet 1/21/2022 1/31/2022 Jose Cruz Hunter MD        Follow-up Information     Follow up With Specialties Details Why Contact Info    Mayelin Leonard MD Family Medicine In 2 days  [address]  [address]  [place] [facility] [address]  [phone]      [facility] DERMATOLOGY Dermatology In 2 days  [address] [facility] [address]  [phone]           Jose Cruz Hunter MD  01/21/22 3811

## 2022-01-24 LAB
HSV PCR SPECIMEN SOURCE: NORMAL
HSV1 PCR RESULT: NOT DETECTED
HSV2 PCR RESULT: NOT DETECTED

## 2022-03-19 ENCOUNTER — HOSPITAL ENCOUNTER (EMERGENCY)
Facility: OTHER | Age: 42
Discharge: HOME | End: 2022-03-19
Attending: EMERGENCY MEDICINE
Payer: MEDICAID

## 2022-03-19 VITALS
WEIGHT: 134.38 LBS | RESPIRATION RATE: 16 BRPM | SYSTOLIC BLOOD PRESSURE: 126 MMHG | BODY MASS INDEX: 24.58 KG/M2 | DIASTOLIC BLOOD PRESSURE: 91 MMHG | HEART RATE: 63 BPM | TEMPERATURE: 98 F | OXYGEN SATURATION: 98 %

## 2022-03-19 DIAGNOSIS — M54.16 LUMBAR RADICULOPATHY: Primary | ICD-10-CM

## 2022-03-19 LAB
B-HCG UR QL: NEGATIVE
CTP QC/QA: YES

## 2022-03-19 PROCEDURE — 63600175 PHARM REV CODE 636 W HCPCS: Performed by: EMERGENCY MEDICINE

## 2022-03-19 PROCEDURE — 96372 THER/PROPH/DIAG INJ SC/IM: CPT | Performed by: EMERGENCY MEDICINE

## 2022-03-19 PROCEDURE — 81025 URINE PREGNANCY TEST: CPT | Performed by: EMERGENCY MEDICINE

## 2022-03-19 PROCEDURE — 25000003 PHARM REV CODE 250: Performed by: EMERGENCY MEDICINE

## 2022-03-19 PROCEDURE — 99284 EMERGENCY DEPT VISIT MOD MDM: CPT | Mod: 25

## 2022-03-19 RX ORDER — LIDOCAINE 50 MG/G
1 PATCH TOPICAL
Status: DISCONTINUED | OUTPATIENT
Start: 2022-03-19 | End: 2022-03-19 | Stop reason: HOSPADM

## 2022-03-19 RX ORDER — LIDOCAINE 50 MG/G
1 PATCH TOPICAL DAILY
Qty: 30 PATCH | Refills: 0 | OUTPATIENT
Start: 2022-03-19 | End: 2022-12-29

## 2022-03-19 RX ORDER — ORPHENADRINE CITRATE 30 MG/ML
60 INJECTION INTRAMUSCULAR; INTRAVENOUS
Status: COMPLETED | OUTPATIENT
Start: 2022-03-19 | End: 2022-03-19

## 2022-03-19 RX ORDER — HYDROCODONE BITARTRATE AND ACETAMINOPHEN 5; 325 MG/1; MG/1
1 TABLET ORAL
Status: COMPLETED | OUTPATIENT
Start: 2022-03-19 | End: 2022-03-19

## 2022-03-19 RX ORDER — HYDROCODONE BITARTRATE AND ACETAMINOPHEN 5; 325 MG/1; MG/1
1 TABLET ORAL EVERY 4 HOURS PRN
Qty: 12 TABLET | Refills: 0 | OUTPATIENT
Start: 2022-03-19 | End: 2022-12-29

## 2022-03-19 RX ADMIN — HYDROCODONE BITARTRATE AND ACETAMINOPHEN 1 TABLET: 5; 325 TABLET ORAL at 04:03

## 2022-03-19 RX ADMIN — LIDOCAINE 1 PATCH: 50 PATCH CUTANEOUS at 04:03

## 2022-03-19 RX ADMIN — ORPHENADRINE CITRATE 60 MG: 30 INJECTION INTRAMUSCULAR; INTRAVENOUS at 04:03

## 2022-03-19 NOTE — ED TRIAGE NOTES
Pt presents to the ER with complaints of pain in the right lower back radiating down into the right hip and groin as well as into the knee and ankle. Pt states pain is worse with movement and weight-bearing; denies injury or trauma. Pt also reporting numbness and tingling in the left arm that started around the same time. Pt also reporting pain in the middle of the chest that started today. Pt with a hx of spinal stenosis.

## 2022-03-19 NOTE — ED NOTES
LOC: The patient is awake, alert, and oriented to self, place, time, and situation. Pt is calm and cooperative. Affect is appropriate.  Speech is appropriate and clear.     APPEARANCE: Patient resting on stretcher; appears uncomfortable but in no acute distress.  Patient is clean and well groomed.    SKIN: The skin is warm and dry; color consistent with ethnicity.  Patient has normal skin turgor and moist mucus membranes.  Skin intact; no breakdown or bruising noted.     MUSCULOSKELETAL: Patient moving upper and lower extremities but complains of pain in the right lower back that radiates into the left hip, groin, knee and ankle.  Denies weakness.     RESPIRATORY: Airway is open and patent. Respirations spontaneous, even, easy, and non-labored.  Patient has a normal effort and rate.  No accessory muscle use noted. Denies cough.     CARDIAC:  Normal rate noted.  No peripheral edema noted. No complaints of chest pain.      ABDOMEN: Soft and non tender to palpation.  No distention noted. Pt denies abdominal pain; denies nausea, vomiting, diarrhea, or constipation.    NEUROLOGIC: Eyes open spontaneously.  Behavior appropriate to situation.  Follows commands; facial expression symmetrical.  Purposeful motor response noted; reports numbness and tingling in the right arm. Pt denies headache; denies lightheadedness or dizziness; denies visual disturbances; denies loss of balance; denies unilateral weakness.

## 2022-03-19 NOTE — ED PROVIDER NOTES
SCRIBE #1 NOTE: I, Sajan Raymond, am scribing for, and in the presence of, Dr. Drummond.     Source of History:  Patient    Chief complaint:  Back Pain (Lower back pain shooting down right hip & leg also to right groin that started on Monday. Has increasingly become worse. Difficulty walking. Denies any recent trauma)    HPI:  Anay Louis is a 41 y.o. female presenting with worsening right lower back pain that radiates down to the right foot for the past 5 days. Patient reports experiencing numbness and tingling on the right side. She also notes onset of right sided chest pain today that worsens with taking deep breaths. Patient reports mild SOB but denies any cough, fevers, chills, or urinary symptoms. She notes currently experiencing a headache. Patient denies any recent injuries or unusual activity. She notes no recent traveling or long trips. Patient reports taking tizanidine, diclofenac, and THC for pain management. She notes being unable to take motrin or ibuprofen due to adverse reactions. This is the extent to the patients complaints today here in the emergency department.    ROS: As per HPI and below:  General: No fever.  No chills.  Eyes: No visual changes.  ENT: No sore throat. No ear pain  Head: No headache.    Respiratory: Positive for shortness of breath. No cough.  Cardiovascular: Positive for chest pain.  Abdomen: No abdominal pain.  No nausea or vomiting.  Genito-Urinary: No abnormal urination.  Neurologic: No focal weakness. Positive for paresthesias. Positive for headache.  MSK: Positive for back pain. Positive for leg pain.  Integument: No rashes or lesions.  Hematologic: No easy bruising.  Endocrine: No excessive thirst or urination.    Review of patient's allergies indicates:  No Known Allergies    PMH:  As per HPI and below:  Past Medical History:   Diagnosis Date    Asthma     Migraine headache      Past Surgical History:   Procedure Laterality Date     SECTION         Social  History     Tobacco Use    Smoking status: Never Smoker    Smokeless tobacco: Never Used   Substance Use Topics    Alcohol use: No    Drug use: No       Physical Exam:    BP (!) 126/91 (BP Location: Left arm, Patient Position: Sitting)   Pulse 63   Temp 98.1 °F (36.7 °C)   Resp 16   Wt 61 kg (134 lb 6.4 oz)   SpO2 98%   Breastfeeding No   BMI 24.58 kg/m²   Nursing note and vital signs reviewed.  Appearance: No acute distress.  Eyes: No conjunctival injection.  Neck: No deformity.   ENT: Oropharynx clear.  No stridor.   Chest/ Respiratory: Clear to auscultation bilaterally.  Good air movement.  No wheezes.  No rhonchi. No rales. No accessory muscle use.  Cardiovascular: Regular rate and rhythm.  No murmurs. No gallops. No rubs.  Abdomen: Soft.  Not distended.  Nontender.  No guarding.  No rebound. Non-peritoneal.  Musculoskeletal: Good range of motion all joints.  No deformities.  Neck supple.  No meningismus. Tenderness along right paraspinal lumbar and thoracic spine as well as laterally over ribs. Increased pain with movement of right leg.  Skin: No rashes seen.  Good turgor.  No abrasions.  No ecchymoses.  Neurologic: Motor intact.  Sensation intact.  Cerebellar intact.  Cranial nerves intact. Normal S1 function in foot.  Mental Status:  Alert and oriented x 3.  Appropriate, conversant.      I decided to obtain the patient's medical records.  Summary of Medical Records:  Past Medical History:   Diagnosis Date    Asthma     Migraine headache      Initial Impression  41 y.o. female with musculoskeletal appearing pain in right side in thoracic and lumbar spine.  She does complain of some chest discomfort, however has no cardiac or pulmonary embolus risk factors and is PERC negative.    Differential Dx:  Muscular pain, herniated disc, spine fracture, intra-abdominal causes and urinary tract infection, dehydration.     MDM:  Plan: Pain control and muscle relaxers.       Patient improved with treatment in  the emergency department and comfortable going home. Discussed reasons to return and importance of followup.  Patient understands that the emergency visit today is primarily to address immediate concerns and to rule out emergent cause of symptoms and that they may require further workup and evaluation as an outpatient. All questions addressed and patient given discharge instructions and followup information.  Will give short prescription for Norco which gave patient relief.  Did note that patient received previous prescription for tramadol.  She has established pain management has an appointment coming up within the next few weeks.  Do not suspect acute neurologic compromise or infection.               Scribe Attestation:   Scribe #1: I performed the above scribed service and the documentation accurately describes the services I performed. I attest to the accuracy of the note.    Physician Attestation for Scribe: I, Kandace Drummond MD, reviewed documentation as scribed in my presence, which is both accurate and complete.    Diagnostic Impression:    1. Lumbar radiculopathy         ED Disposition Condition    Discharge Stable          ED Prescriptions     Medication Sig Dispense Start Date End Date Auth. Provider    HYDROcodone-acetaminophen (NORCO) 5-325 mg per tablet Take 1 tablet by mouth every 4 (four) hours as needed for Pain. 12 tablet 3/19/2022  Kandace Drummond MD    LIDOcaine (LIDODERM) 5 % Place 1 patch onto the skin once daily. Remove & Discard patch within 12 hours or as directed by MD 30 patch 3/19/2022  Kandace Drummond MD        Follow-up Information     Follow up With Specialties Details Why Contact Info    Mayelin Leonard MD Family Medicine   [address] [facility]  [address]  [place] [facility] [place] [facility] [facility] [address]  [phone]             Kandace Drummond MD  03/20/22 0083

## 2022-08-18 ENCOUNTER — HOSPITAL ENCOUNTER (EMERGENCY)
Facility: OTHER | Age: 42
Discharge: HOME | End: 2022-08-18
Attending: EMERGENCY MEDICINE
Payer: MEDICAID

## 2022-08-18 VITALS
HEIGHT: 62 IN | BODY MASS INDEX: 23.92 KG/M2 | WEIGHT: 130 LBS | RESPIRATION RATE: 18 BRPM | OXYGEN SATURATION: 96 % | DIASTOLIC BLOOD PRESSURE: 83 MMHG | HEART RATE: 82 BPM | SYSTOLIC BLOOD PRESSURE: 143 MMHG | TEMPERATURE: 99 F
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
## 2022-08-18 DIAGNOSIS — S93.401A SPRAIN OF RIGHT ANKLE, UNSPECIFIED LIGAMENT, INITIAL ENCOUNTER: Primary | ICD-10-CM

## 2022-08-18 PROCEDURE — 25000003 PHARM REV CODE 250: Performed by: NURSE PRACTITIONER

## 2022-08-18 PROCEDURE — 99284 EMERGENCY DEPT VISIT MOD MDM: CPT

## 2022-08-18 RX ORDER — METHOCARBAMOL 500 MG/1
1000 TABLET, FILM COATED ORAL 3 TIMES DAILY
Qty: 30 TABLET | Refills: 0 | Status: SHIPPED | OUTPATIENT
Start: 2022-08-18 | End: 2022-08-23

## 2022-08-18 RX ORDER — NAPROXEN 375 MG/1
375 TABLET ORAL 2 TIMES DAILY WITH MEALS
Qty: 60 TABLET | Refills: 0 | OUTPATIENT
Start: 2022-08-18 | End: 2022-12-29

## 2022-08-18 RX ORDER — HYDROCODONE BITARTRATE AND ACETAMINOPHEN 5; 325 MG/1; MG/1
1 TABLET ORAL
Status: COMPLETED | OUTPATIENT
Start: 2022-08-18 | End: 2022-08-18

## 2022-08-18 RX ORDER — DICLOFENAC SODIUM 10 MG/G
2 GEL TOPICAL 4 TIMES DAILY
Qty: 100 G | Refills: 0 | OUTPATIENT
Start: 2022-08-18 | End: 2022-12-29

## 2022-08-18 RX ADMIN — HYDROCODONE BITARTRATE AND ACETAMINOPHEN 1 TABLET: 5; 325 TABLET ORAL at 10:08

## 2022-08-18 NOTE — Clinical Note
"Sequeasia "Sequeasia" Heriberto was seen and treated in our emergency department on 8/18/2022.  She may return to school on 08/23/2022.      If you have any questions or concerns, please don't hesitate to call.      JAVON Mauro"

## 2022-08-19 ENCOUNTER — PATIENT OUTREACH (OUTPATIENT)
Dept: EMERGENCY MEDICINE | Facility: OTHER | Age: 42
End: 2022-08-19

## 2022-08-19 NOTE — ED PROVIDER NOTES
"Source of History:  Patient     Chief complaint:  Ankle Pain (X 20 min ago. Pt states she slipped off the curb and "rolled her right ankle")      HPI:  Anay Louis is a 42 y.o. female presenting to the emergency department with right lateral ankle pain after tripping while running.  Unable to bear weight.       This is the extent to the patients complaints today here in the emergency department.    PMH:  As per HPI and below:  Past Medical History:   Diagnosis Date    Asthma     Migraine headache      Past Surgical History:   Procedure Laterality Date     SECTION         Social History     Tobacco Use    Smoking status: Never Smoker    Smokeless tobacco: Never Used   Substance Use Topics    Alcohol use: No    Drug use: No     Review of patient's allergies indicates:  No Known Allergies    ROS: As per HPI and below:  General: No fever.  No chills.  Eyes: No visual changes.   ENT: No sore throat. No ear pain.  Urinary: No abnormal urination.  MSK: ankle pain   Integument: No rashes or lesions.      Physical Exam:    BP (!) 156/75   Pulse 94   Temp 98.7 °F (37.1 °C) (Oral)   Resp 18   Ht 5' 2" (1.575 m)   Wt 59 kg (130 lb)   SpO2 95%   BMI 23.78 kg/m²   Vitals:    22 1905 22 1907 22 2204   BP: (!) 156/75     Pulse: 94     Resp: 16  18   Temp: 98.7 °F (37.1 °C)     TempSrc: Oral     SpO2: 95%     Weight: 59 kg (130 lb)     Height:  5' 2" (1.575 m)        Nursing note and vital signs reviewed.  Appearance: No acute distress.  Eyes: No conjunctival injection.  Extraocular muscles are intact.  ENT: Normal phonation.  Cardio: DP +2 bilaterally  Musculoskeletal: right lateral ankle is TTP with mild swelling.  No deformity.  No ecchymosis.  decreased ROM due to pain.   Skin: No rashes seen.  Good turgor.  No abrasions.  No ecchymoses.  Mental Status:  Alert and oriented x 3.  Appropriate, conversant.    Labs Reviewed   POCT URINE PREGNANCY       X-Ray Ankle Complete Right   Final " Result      1. No acute displaced fracture or dislocation of the ankle.         Electronically signed by: Jian Wilson MD   Date:    08/18/2022   Time:    20:33            Initial Impression/ Differential Dx:  Differential Diagnosis includes, but is not limited to:  Ankle sprain/strain, tib/fib fracture, ankle disclocation, metatarsal fracture, gout      MDM:    42 y.o. female with right ankle pain after trip and fall.  Xray negative.  Ace wrap and crutches given, discussed rice with the patient. Ortho referral placed         Diagnostic Impression:    1. Sprain of right ankle, unspecified ligament, initial encounter    2. Fall         ED Disposition Condition    Discharge Stable          ED Prescriptions     Medication Sig Dispense Start Date End Date Auth. Provider    naproxen (NAPROSYN) 375 MG tablet Take 1 tablet (375 mg total) by mouth 2 (two) times daily with meals. 60 tablet 8/18/2022  JAVON Mauro    methocarbamoL (ROBAXIN) 500 MG Tab Take 2 tablets (1,000 mg total) by mouth 3 (three) times daily. for 5 days 30 tablet 8/18/2022 8/23/2022 JAVON Mauro    diclofenac sodium (VOLTAREN) 1 % Gel Apply 2 g topically 4 (four) times daily. for 10 days 100 g 8/18/2022 8/28/2022 JAVON Mauro        Follow-up Information     Follow up With Specialties Details Why Contact Info    Mayelin Leonard MD Family Medicine Schedule an appointment as soon as possible for a visit in 3 days  [address]  [address]  [facility] [address]  [phone]      [facility] - Emergency Dept Emergency Medicine Go to  If symptoms worsen [address] [id number] [phone]          ED Prescriptions     Medication Sig Dispense Start Date End Date Auth. Provider    naproxen (NAPROSYN) 375 MG tablet Take 1 tablet (375 mg total) by mouth 2 (two) times daily with meals. 60 tablet 8/18/2022  JAVON Mauro    methocarbamoL (ROBAXIN) 500 MG Tab Take 2 tablets  (1,000 mg total) by mouth 3 (three) times daily. for 5 days 30 tablet 8/18/2022 8/23/2022 Chata Farley, JAVON    diclofenac sodium (VOLTAREN) 1 % Gel Apply 2 g topically 4 (four) times daily. for 10 days 100 g 8/18/2022 8/28/2022 Chata Farley, JAVON Souza  08/18/22 0023

## 2022-08-19 NOTE — FIRST PROVIDER EVALUATION
"Medical screening exam completed.  I have conducted a focused provider triage encounter, findings are as follows:    Brief history of present illness:  Right ankle pain after tripping while running PTA    Vitals:    08/18/22 1905 08/18/22 1907   BP: (!) 156/75    Pulse: 94    Resp: 16    Temp: 98.7 °F (37.1 °C)    TempSrc: Oral    SpO2: 95%    Weight: 59 kg (130 lb)    Height:  5' 2" (1.575 m)       Pertinent physical exam:  Right ankle swelling, unable to bear weight    Brief workup plan:  xray    Preliminary workup initiated; this workup will be continued and followed by the physician or advanced practice provider that is assigned to the patient when roomed.  "

## 2022-12-29 ENCOUNTER — TELEPHONE (OUTPATIENT)
Dept: SPINE | Facility: CLINIC | Age: 42
End: 2022-12-29
Payer: MEDICAID

## 2022-12-29 ENCOUNTER — HOSPITAL ENCOUNTER (EMERGENCY)
Facility: OTHER | Age: 42
Discharge: HOME | End: 2022-12-29
Attending: EMERGENCY MEDICINE
Payer: MEDICAID

## 2022-12-29 VITALS
DIASTOLIC BLOOD PRESSURE: 65 MMHG | SYSTOLIC BLOOD PRESSURE: 137 MMHG | HEART RATE: 73 BPM | HEIGHT: 62 IN | TEMPERATURE: 98 F | OXYGEN SATURATION: 97 % | WEIGHT: 136 LBS | BODY MASS INDEX: 25.03 KG/M2 | RESPIRATION RATE: 16 BRPM

## 2022-12-29 DIAGNOSIS — M54.41 ACUTE BILATERAL LOW BACK PAIN WITH BILATERAL SCIATICA: Primary | ICD-10-CM

## 2022-12-29 DIAGNOSIS — M54.42 ACUTE BILATERAL LOW BACK PAIN WITH BILATERAL SCIATICA: Primary | ICD-10-CM

## 2022-12-29 LAB
B-HCG UR QL: NEGATIVE
CTP QC/QA: YES
HCV AB SERPL QL IA: NEGATIVE
HIV 1+2 AB+HIV1 P24 AG SERPL QL IA: NEGATIVE

## 2022-12-29 PROCEDURE — 63600175 PHARM REV CODE 636 W HCPCS: Performed by: EMERGENCY MEDICINE

## 2022-12-29 PROCEDURE — 99284 EMERGENCY DEPT VISIT MOD MDM: CPT | Mod: 25

## 2022-12-29 PROCEDURE — 87389 HIV-1 AG W/HIV-1&-2 AB AG IA: CPT | Performed by: EMERGENCY MEDICINE

## 2022-12-29 PROCEDURE — 81025 URINE PREGNANCY TEST: CPT | Performed by: EMERGENCY MEDICINE

## 2022-12-29 PROCEDURE — 25000003 PHARM REV CODE 250: Performed by: EMERGENCY MEDICINE

## 2022-12-29 PROCEDURE — 86803 HEPATITIS C AB TEST: CPT | Performed by: EMERGENCY MEDICINE

## 2022-12-29 PROCEDURE — 96372 THER/PROPH/DIAG INJ SC/IM: CPT | Performed by: EMERGENCY MEDICINE

## 2022-12-29 RX ORDER — TOPIRAMATE 100 MG/1
50 TABLET, FILM COATED ORAL NIGHTLY
COMMUNITY

## 2022-12-29 RX ORDER — LIDOCAINE 50 MG/G
PATCH TOPICAL
Qty: 30 PATCH | Refills: 0 | OUTPATIENT
Start: 2022-12-29 | End: 2024-01-04

## 2022-12-29 RX ORDER — NAPROXEN 500 MG/1
500 TABLET ORAL
Status: DISCONTINUED | OUTPATIENT
Start: 2022-12-29 | End: 2022-12-29

## 2022-12-29 RX ORDER — ORPHENADRINE CITRATE 30 MG/ML
60 INJECTION INTRAMUSCULAR; INTRAVENOUS
Status: DISCONTINUED | OUTPATIENT
Start: 2022-12-29 | End: 2022-12-29

## 2022-12-29 RX ORDER — ORPHENADRINE CITRATE 30 MG/ML
60 INJECTION INTRAMUSCULAR; INTRAVENOUS
Status: COMPLETED | OUTPATIENT
Start: 2022-12-29 | End: 2022-12-29

## 2022-12-29 RX ORDER — NAPROXEN 500 MG/1
500 TABLET ORAL
Status: COMPLETED | OUTPATIENT
Start: 2022-12-29 | End: 2022-12-29

## 2022-12-29 RX ORDER — LIDOCAINE 50 MG/G
1 PATCH TOPICAL
Status: DISCONTINUED | OUTPATIENT
Start: 2022-12-29 | End: 2022-12-29 | Stop reason: HOSPADM

## 2022-12-29 RX ORDER — TIZANIDINE HYDROCHLORIDE 4 MG/1
5 CAPSULE, GELATIN COATED ORAL NIGHTLY
COMMUNITY
End: 2023-07-14

## 2022-12-29 RX ORDER — METHOCARBAMOL 750 MG/1
TABLET, FILM COATED ORAL
Qty: 30 TABLET | Refills: 0 | OUTPATIENT
Start: 2022-12-29 | End: 2023-07-14

## 2022-12-29 RX ORDER — DICLOFENAC SODIUM 10 MG/G
2 GEL TOPICAL 3 TIMES DAILY PRN
Qty: 100 G | Refills: 0 | OUTPATIENT
Start: 2022-12-29 | End: 2024-01-04

## 2022-12-29 RX ORDER — NAPROXEN 500 MG/1
500 TABLET ORAL 2 TIMES DAILY PRN
Qty: 60 TABLET | Refills: 0 | OUTPATIENT
Start: 2022-12-29 | End: 2024-01-04

## 2022-12-29 RX ADMIN — NAPROXEN 500 MG: 500 TABLET ORAL at 09:12

## 2022-12-29 RX ADMIN — LIDOCAINE 1 PATCH: 50 PATCH CUTANEOUS at 10:12

## 2022-12-29 RX ADMIN — ORPHENADRINE CITRATE 60 MG: 30 INJECTION INTRAMUSCULAR; INTRAVENOUS at 10:12

## 2022-12-29 NOTE — TELEPHONE ENCOUNTER
----- Message from Katie Morgan sent at 12/29/2022 12:07 PM CST -----  Name of Who is Calling:BEVERLEY BUSTAMANTE [[id number]]              What is the request in detail:Requesting a call back to schedule appt to f/u after hospital visit.               Can the clinic reply by [facility]:              What Number to Call Back if not in [facility]:[phone]

## 2022-12-29 NOTE — ED PROVIDER NOTES
"  Source of History:  Medical record, patient    Chief complaint:  Per triage note: "Back Pain (Reports BL back pain radiating to BL lower abd/pelvis onset 2 weeks. Denies dysuria and hematuria. Aaox4. Denies hx of kidney stones.)  "    HPI:    Patient is a 41 y/o female who presents c/o back pain x 2 weeks. She reports that the pain starts in her hips radiates to groin, bilateral lower extremities.  She reports associated paresthesias her lateral legs.  No clear inciting factor.  She denies any trauma. She is compliant with prescribed tizanidine and diclofenac. She is in physical therapy for neck problems and for a sprained ankle that occurred in August.  Her last physical therapy session was yesterday.  She denies fever or urinary issues. She denies hx of IVDU.     This is the extent of the patient's complaints at this time.     ROS:   As per HPI and below:   General: No fever.   HENT: No facial pain.   Neck: Notes neck pain.   Eyes: No eye pain.   Cardiovascular: No chest pain.   Respiratory:  No dyspnea.   GI: No abdominal pain. No nausea. No vomiting. No diarrhea.   : Notes pelvic pain. No urinary issues.   Skin: No rashes.   Neuro:  No syncope.  No focal deficits.   Musculoskeletal: Notes arthralgias and myalgias.   All other systems reviewed and are negative.      Review of patient's allergies indicates:  No Known Allergies    PMH:  As per HPI and below:  Past Medical History:   Diagnosis Date    Asthma     Migraine headache        Past Surgical History:   Procedure Laterality Date     SECTION         Social History     Tobacco Use    Smoking status: Never    Smokeless tobacco: Never   Substance Use Topics    Alcohol use: No    Drug use: Yes     Types: Marijuana     Comment: prescribed THC       Physical Exam:      Nursing note and vitals reviewed.  /65   Pulse 73   Temp 98.3 °F (36.8 °C)   Resp 16   Ht 5' 2" (1.575 m)   Wt 61.7 kg (136 lb)   LMP 2022 (Exact Date)   SpO2 97%   " Breastfeeding No   BMI 24.87 kg/m²     Constitutional: Uncomfortable appearance AAOx3. Well-developed and well-nourished.   HENT:   Mouth/Throat: Oropharynx is clear and moist.  Eyes: Pupils are equal, round, and reactive to light. No discharge. Anicteric.  Neck: Normal range of motion. Neck supple. No midline tenderness, stepoffs, or deformities.  Cardiovascular: Normal rate.  Pulmonary/Chest: Effort normal.  Abdominal: Soft. Bowel sounds normal. No distension and no mass. There is no tenderness. There is no rebound, no guarding.  Musculoskeletal: Normal range of motion. No midline spinal tenderness. No stepoffs or deformities. No paraspinal tenderness and spasm. Right ankle brace.   Neurological: Alert and oriented to person, place, and time. No gross cranial nerve deficit. Coordination normal. No UE/LE light  touch or strength deficits. Able to do deep knee bend, stand on heels and toes. Normal gait.   Skin: Skin is warm and dry.   Ext: 2+ radial pulses  Psychiatric: Behavior is normal. Judgment normal.        MDM:    I decided to obtain the patient's medical records.   MDM:  Anay Louis is a 42 y.o. female with chronic neck pain, recent right ankle sprain in physical therapy, asthma, migraines who presents with CC low back pain. No bowel/bladder incontinence/retention, no weakness, ambulating normally. Denies fevers, chills, nightsweats, LE weakness or paresthesias, saddle anesthesia, DM Hx, recent procedure, or trauma.     My initial differential diagnosis included ruptured AAA, epidural abscess, cauda equina syndrome, fracture, disc herniation, sciatica, and musculoskeletal back pain.     Exam with no stepoff/deformity to lower lumbar spine, neuro intact, no abdominal mass, gait intact, no signs of infection and appears nontoxic.  At present no evidence of cauda equina, acute neurologic compromise, or AAA. By history and physical exam, I believe the patient has muscle spasm with lumbar strain.  I will  provide anti-inflammatories and muscle relaxants.  She is already on tizanidine.  Will change to Robaxin.     ED Course as of 12/29/22 1910   Thu Dec 29, 2022   1025 Patient reports significant symptomatic improvement after given NSAIDs, antispasmodics here.  She feels comfortable with discharge home.  --  I discussed with patient and/or guardian/caretaker that this evaluation in the ED does not suggest any emergent or life threatening medical condition requiring admission or further immediate intervention or diagnostics. Regardless, an unremarkable evaluation in the ED does not preclude the development or presence of a serious or life threatening condition. As such, patient was instructed to return for any worsening, new, changed, or concerning symptoms.     I had a detailed discussion with patient and/or guardian/caretaker regarding findings, plan, return precautions, importance of medication adherence, need to follow-up with a PCP and specialist. All questions answered.     Management decisions for this encounter made during COVID-19 public health emergency. Available resources, standards for appropriate emergency department evaluation, and admission vs. discharge standards have necessarily shifted and remain dynamic.     Note was created using voice recognition software. It may have occasional typographical errors not identified and edited despite initial review prior to signing.   [RC]      ED Course User Index  [RC] Leonid Betancourt MD       Medications   naproxen tablet 500 mg (500 mg Oral Given 12/29/22 0954)   orphenadrine injection 60 mg (60 mg Intramuscular Given 12/29/22 1005)              I, Wanda Cerda, scribed for, and in the presence of, Dr. Betancourt. I performed the scribed service and the documentation accurately describes the services I performed. I attest to the accuracy of the note.     Physician Attestation for Scribe:   I, Leonid Betancourt MD reviewed documentation as scribed in my presence, which  is both accurate and complete.    Diagnostic Impression:    1. Acute bilateral low back pain with bilateral sciatica         ED Disposition Condition    Discharge Good          No future appointments.   ED Prescriptions       Medication Sig Dispense Start Date End Date Auth. Provider    methocarbamoL (ROBAXIN) 750 MG Tab Take 1-2 tablets three times daily as needed for muscle spasm pain.   Do not mix with tizanidine and other muscle relaxants. 30 tablet 12/29/2022 -- Leonid Betancourt MD    naproxen (NAPROSYN) 500 MG tablet Take 1 tablet (500 mg total) by mouth 2 (two) times daily as needed (pain). 60 tablet 12/29/2022 -- Leonid Betancourt MD    LIDOcaine (LIDODERM) 5 % Apply to affected area as needed for pain for 12 hours, then off for 12 hours. Discard after each use.  May use 4% lidocaine patch as alternative. 30 patch 12/29/2022 -- Leonid Betancourt MD    diclofenac sodium (VOLTAREN) 1 % Gel Apply 2 g topically 3 (three) times daily as needed (muscle spasm pain). Apply 2 grams to affected area 3 times daily as needed 100 g 12/29/2022 -- Leonid Betancourt MD          Follow-up Information       Follow up With Specialties Details Why Contact Info Additional Information    Your primary care doctor  In 2 days For recheck with your primary care doctor      [facility] Schedule an appointment as soon as possible for a visit  For recheck with specialist [address], [address]  [facility] [address]  [phone] [facility], 4th Floor Please park in [place] [address] and use [place] elevators               Leonid Betancourt MD  12/29/22 1031       Leonid Betancourt MD  12/29/22 7392

## 2022-12-29 NOTE — ED TRIAGE NOTES
Patient reports to the ED w/ complaint of lower back pain radiating into her pelvis and down through both legs. Patient reports the pain coming and going for the past two months, but has progressively gotten worse this week.  Denies fever, urinary symptoms or incontinence and falls.

## 2022-12-29 NOTE — DISCHARGE INSTRUCTIONS
Thank you for letting us take care of you today! It was nice meeting you, and I hope you feel better soon.     Call your primary care doctor to make the first available appointment.     Keep all your medical appointments.     Take your regular medication as prescribed. Contact your primary care provider before running out of medication, or for any problems obtaining them.    Do not drive or operate heavy machinery while taking opioid or muscle relaxing medications. Examples include norco, percocet, xanax, valium, flexeril.     Overuse or long term use of pain and sedating medication may lead to addiction, dependence, organ failure, family and work problems, legal problems, accidental overdose and death.    If you do not have health insurance, you probably can afford it:  Call [phone] ([facility] hotline) or go to www.[employer].la.gov    Your evaluation in the ER does not suggest any emergent or life threatening medical condition requiring admission or immediate intervention beyond that provided in the ER.   However, the signs of a serious problem sometimes take more time to appear.     Do not hesitate to return to the ER if any of the following occur:    Weakness, dizziness, fainting, or loss of consciousness   Fever of 100.4ºF (38ºC) or higher  Any worse symptoms  Any new or concerning symptoms    To protect yourself and others from COVID19:  Get vaccinated.   Anyone over 6 months old is eligible for vaccination.   If your last dose was over 6 months ago, you are probably due for another shot.   Vaccination is shown to prevent getting sick, ending up in the hospital, or dying because of COVID19.     If not vaccinated or over a long time since your last dose:  Your shot is waiting for you. To get it:   Text your ZIP code to GETVAX ([id number]) or VACUNA ([id number]) in [place]  call 311, or [phone], or [phone], or [phone],   go to www.vaccines.gov, or  Call your health provider    If exposed to someone with  cold, flu, or COVID19 symptoms, consider quarantining or at least wearing a mask around others for at least 5 days.   Even if you have no symptoms   Otherwise you could give the virus to someone who dies from it    Some symptoms of COVID19 include congestion, fever, cough, muscle aches, sore throat, breathing troubles, headaches, stomach upset, diarrhea.   Every [place] household is eligible to order 4 free at-home COVID-19 tests from www.covidtests.gov    You were seen for your back pain.  At this time, it does not appear your pain is from a dangerous cause.     You have injured the muscles (strain) or ligaments (sprain) around the spine. Muscle spasm is often present and adds to the pain.     Do your activities as tolerated. Bedrest will probably make your back pain worse.  Take NSAIDs regularly over the next 1-2 days. Do not exceed the maximum recommended daily dose.     Take all your medications exactly as prescribed.  Call your primary care provider to make the first available appointment.     A back sprain or muscle strain usually gets better in 2-3 weeks. If pain continues and does not respond to medical treatment after 3-4 weeks contact your primary care doctor or return to the ER.     Do not drive or operate heavy machinery while taking valium, lortab, percocet or other sedating medications. Prolonged or overuse of drugs prescribed for pain, sedation or muscle relaxation may lead to addiction, dependence, family problems, legal problems, organ failure, death.      RETURN TO THE ER if any of the following occur:    Pain becomes worse or spreads into your arms or legs   Weakness, numbness or pain in one or both arms or legs   Loss of bowel or bladder control   Numbness in the groin area   Difficulty walking  New or worse pain: if it feels different, becomes more severe, lasts longer, or begins to spread into your shoulder, arm, neck, jaw or back   Shortness of breath or increased pain with breathing   Cough with  dark colored sputum (phlegm) or blood   Weakness, dizziness, fainting, falling out, or loss of consciousness   Fever of 100.4ºF (38ºC) or higher  Any new or concerning symptoms

## 2022-12-29 NOTE — TELEPHONE ENCOUNTER
Staff returned patient call back request to set up appointment. No answer/unable to leave voice message

## 2023-02-04 ENCOUNTER — HOSPITAL ENCOUNTER (EMERGENCY)
Facility: OTHER | Age: 43
Discharge: HOME | End: 2023-02-04
Attending: EMERGENCY MEDICINE
Payer: MEDICAID

## 2023-02-04 VITALS
BODY MASS INDEX: 25.21 KG/M2 | HEIGHT: 62 IN | WEIGHT: 137 LBS | TEMPERATURE: 98 F | HEART RATE: 66 BPM | RESPIRATION RATE: 18 BRPM | DIASTOLIC BLOOD PRESSURE: 85 MMHG | OXYGEN SATURATION: 99 % | SYSTOLIC BLOOD PRESSURE: 130 MMHG

## 2023-02-04 DIAGNOSIS — N93.9 VAGINAL BLEEDING: Primary | ICD-10-CM

## 2023-02-04 LAB
ABO + RH BLD: NORMAL
ALBUMIN SERPL BCP-MCNC: 4.1 G/DL (ref 3.5–5.2)
ALP SERPL-CCNC: 62 U/L (ref 55–135)
ALT SERPL W/O P-5'-P-CCNC: 12 U/L (ref 10–44)
ANION GAP SERPL CALC-SCNC: 6 MMOL/L (ref 8–16)
AST SERPL-CCNC: 17 U/L (ref 10–40)
B-HCG UR QL: NEGATIVE
BASOPHILS # BLD AUTO: 0.05 K/UL (ref 0–0.2)
BASOPHILS NFR BLD: 0.7 % (ref 0–1.9)
BILIRUB SERPL-MCNC: 0.4 MG/DL (ref 0.1–1)
BLD GP AB SCN CELLS X3 SERPL QL: NORMAL
BUN SERPL-MCNC: 12 MG/DL (ref 6–20)
CALCIUM SERPL-MCNC: 9.8 MG/DL (ref 8.7–10.5)
CHLORIDE SERPL-SCNC: 105 MMOL/L (ref 95–110)
CO2 SERPL-SCNC: 28 MMOL/L (ref 23–29)
CREAT SERPL-MCNC: 1 MG/DL (ref 0.5–1.4)
CTP QC/QA: YES
DIFFERENTIAL METHOD: ABNORMAL
EOSINOPHIL # BLD AUTO: 0.1 K/UL (ref 0–0.5)
EOSINOPHIL NFR BLD: 2.1 % (ref 0–8)
ERYTHROCYTE [DISTWIDTH] IN BLOOD BY AUTOMATED COUNT: 12.8 % (ref 11.5–14.5)
EST. GFR  (NO RACE VARIABLE): >60 ML/MIN/1.73 M^2
GLUCOSE SERPL-MCNC: 74 MG/DL (ref 70–110)
HCT VFR BLD AUTO: 40.5 % (ref 37–48.5)
HGB BLD-MCNC: 13.6 G/DL (ref 12–16)
IMM GRANULOCYTES # BLD AUTO: 0.01 K/UL (ref 0–0.04)
IMM GRANULOCYTES NFR BLD AUTO: 0.1 % (ref 0–0.5)
LIPASE SERPL-CCNC: 58 U/L (ref 4–60)
LYMPHOCYTES # BLD AUTO: 2.9 K/UL (ref 1–4.8)
LYMPHOCYTES NFR BLD: 42.9 % (ref 18–48)
MCH RBC QN AUTO: 33.1 PG (ref 27–31)
MCHC RBC AUTO-ENTMCNC: 33.6 G/DL (ref 32–36)
MCV RBC AUTO: 99 FL (ref 82–98)
MONOCYTES # BLD AUTO: 0.5 K/UL (ref 0.3–1)
MONOCYTES NFR BLD: 6.8 % (ref 4–15)
NEUTROPHILS # BLD AUTO: 3.2 K/UL (ref 1.8–7.7)
NEUTROPHILS NFR BLD: 47.4 % (ref 38–73)
NRBC BLD-RTO: 0 /100 WBC
PLATELET # BLD AUTO: 265 K/UL (ref 150–450)
PMV BLD AUTO: 8.9 FL (ref 9.2–12.9)
POTASSIUM SERPL-SCNC: 4 MMOL/L (ref 3.5–5.1)
PROT SERPL-MCNC: 7.1 G/DL (ref 6–8.4)
RBC # BLD AUTO: 4.11 M/UL (ref 4–5.4)
SODIUM SERPL-SCNC: 139 MMOL/L (ref 136–145)
WBC # BLD AUTO: 6.74 K/UL (ref 3.9–12.7)

## 2023-02-04 PROCEDURE — 99283 EMERGENCY DEPT VISIT LOW MDM: CPT | Mod: 25

## 2023-02-04 PROCEDURE — 25000003 PHARM REV CODE 250

## 2023-02-04 PROCEDURE — 81025 URINE PREGNANCY TEST: CPT | Performed by: EMERGENCY MEDICINE

## 2023-02-04 PROCEDURE — 80053 COMPREHEN METABOLIC PANEL: CPT

## 2023-02-04 PROCEDURE — 83690 ASSAY OF LIPASE: CPT

## 2023-02-04 PROCEDURE — 86900 BLOOD TYPING SEROLOGIC ABO: CPT

## 2023-02-04 PROCEDURE — 85025 COMPLETE CBC W/AUTO DIFF WBC: CPT

## 2023-02-04 RX ORDER — ACETAMINOPHEN 500 MG
1000 TABLET ORAL
Status: COMPLETED | OUTPATIENT
Start: 2023-02-04 | End: 2023-02-04

## 2023-02-04 RX ADMIN — ACETAMINOPHEN 1000 MG: 500 TABLET, FILM COATED ORAL at 07:02

## 2023-02-05 NOTE — ED TRIAGE NOTES
C/o abd cramping and vaginal bleeding with clots since her normal menstrual cycle ended on 1/19/23. Reports accompanied intermittent nausea, weakness and headaches. Denies fever.

## 2023-02-05 NOTE — ED PROVIDER NOTES
"Encounter Date: 2/4/2023       History     Chief Complaint   Patient presents with    Vaginal Bleeding     Pt states " vaginal bleeding"" it's been going on since my cycle" symptoms since 01/29/23, menstrual cycle started on 01/22/23, pt states " spotting with clots, sometimes it's heavy sometimes it's light but it's clots in there" denies dizziness, reports being " tired, extremely tired" " headaches"      Patient is a 42-year-old female with a reported past medical history of gastritis, asthma who presents to the ER for evaluation of vaginal bleeding x5 days.  Patient states she was on her period last week, which lasted approximately 7 days.  However after her period ended, she noted continued bleeding for the past 5 days.  She states this bleeding has included clots, which have been between the size of a dime and a quarter.  She states she wears a panty liner, which she has to change approximately 3 to 4 times a day.  She tends tends to notice the blood when she wipes.  She notes associated fatigue, headache, nausea, lower abdominal pain, lower back pain.  She states because of the nausea she has not been eating much, but that she has been able to eat crackers and keep them down.  She also notes she has not been drinking water as much, but states the past few days she is been trying to increase her oral intake.  Patient normally has regular menstrual cycles every month that last approximately 4-5 days.  She states this has never happened to her before.  Patient is planning to try to get pregnant next month, is currently taking prenatal vitamins.  She states she is not currently taking any medications, however her OB plan to start her on a medication at the beginning of her next cycle that would increase her chances of getting pregnant.  Patient states her current lower abdominal pain is a sharp, aching kind of pain and it feels like it wraps around all the way to her back.  Back pain feels similar to her normal " menstrual cramps, but abdominal pain feels different.  She denies any vomiting, fever, diarrhea, constipation, dysuria.  Denies concern for STDs.    The history is provided by the patient.   Review of patient's allergies indicates:  No Known Allergies  Past Medical History:   Diagnosis Date    Asthma     Migraine headache      Past Surgical History:   Procedure Laterality Date     SECTION       History reviewed. No pertinent family history.  Social History     Tobacco Use    Smoking status: Never    Smokeless tobacco: Never   Substance Use Topics    Alcohol use: No    Drug use: Yes     Types: Marijuana     Comment: prescribed THC     Review of Systems   Constitutional:  Positive for fatigue. Negative for chills and fever.   HENT:  Negative for congestion and sore throat.    Respiratory:  Negative for shortness of breath.    Cardiovascular:  Negative for chest pain.   Gastrointestinal:  Positive for nausea. Negative for constipation, diarrhea and vomiting.   Genitourinary:  Positive for vaginal bleeding. Negative for dysuria and vaginal discharge.   Neurological:  Negative for dizziness and syncope.     Physical Exam     Initial Vitals [23 1855]   BP Pulse Resp Temp SpO2   (!) 157/86 81 20 99 °F (37.2 °C) 100 %      MAP       --         Physical Exam    Constitutional: She appears well-developed and well-nourished.   Sitting comfortably on bed.  Mucous membranes dry.   HENT:   Head: Normocephalic and atraumatic.   Eyes: Conjunctivae are normal.   Cardiovascular:  Normal rate, regular rhythm and normal heart sounds.     Exam reveals no gallop and no friction rub.       No murmur heard.  Pulmonary/Chest: Breath sounds normal. She has no wheezes. She has no rhonchi. She has no rales.   Abdominal: Abdomen is soft. Bowel sounds are normal. She exhibits no distension. There is abdominal tenderness (to palpation in left upper quadrant and right upper quadrant. no tenderness to palpation in left lower quadrant  or right lower quadrant). There is no rebound and no guarding.   Musculoskeletal:         General: Normal range of motion.     Neurological: She is alert and oriented to person, place, and time.   Skin: Skin is warm and dry.   Psychiatric: She has a normal mood and affect.       ED Course   Procedures  Labs Reviewed   CBC W/ AUTO DIFFERENTIAL - Abnormal; Notable for the following components:       Result Value    MCV 99 (*)     MCH 33.1 (*)     MPV 8.9 (*)     All other components within normal limits   COMPREHENSIVE METABOLIC PANEL - Abnormal; Notable for the following components:    Anion Gap 6 (*)     All other components within normal limits   LIPASE   POCT URINE PREGNANCY   TYPE & SCREEN          Imaging Results    None          Medications   acetaminophen tablet 1,000 mg (1,000 mg Oral Given 2/4/23 1958)     Medical Decision Making:   Initial Assessment:   42-year-old female who presents to ER for evaluation of vaginal bleeding x5 days with associated fatigue, headache, nausea, abdominal pain.  Patient having normal menstrual cycles, but states bleeding after last menstrual cycle and never stops.  Passing time and quarter-size clots.  Reports using 3-4 panty liners per day.  States she is felt tired, and has not been able to do anything but sleep the past few days.  On exam, patient is tender to palpation in the upper abdomen, nontender in the lower abdomen.  Differential Diagnosis:   Differential diagnoses include but are not limited to, heavy vaginal bleeding, uterine fibroids, STD, ovarian cyst, normal menstruation.  ED Management:  Patient given Tylenol for pain, is unable to take NSAIDs due to history of gastritis.  Labs ordered include CBC, CMP, lipase, which revealed no anemia, no leukocytosis, normal electrolytes, negative lipase.  Discussed with patient possibility of doing a pelvic exam, but due to lab work being reassuring for no signs of anemia, negative urine pregnancy test, and no clinical  symptoms of vaginal pain, irritation, discharge, patient feels comfortable following up with her OB next week.  Symptoms described as well as reassuring lab work did not indicate hemorrhage.  Nausea, headache, fatigue sound like symptoms of dehydration, which patient admits to not eating and drinking well the past few days. Discussed with patient the need to increase oral intake, patient felt capable of doing.  Gave strict return precautions, which include but are not limited to increased vaginal bleeding defined as saturation of 1 pad per hour, severe abdominal pain, increase in weakness, or syncope.  Patient agreeable to returning home, increasing oral intake, and monitoring bleeding until she can see her OB next week.  I discussed this case with my attending, Dr. Anaya, who is in agreement with plan.                        Clinical Impression:   Final diagnoses:  [N93.9] Vaginal bleeding (Primary)        ED Disposition Condition    Discharge Stable          ED Prescriptions    None       Follow-up Information       Follow up With Specialties Details Why Contact Info    Your OB, Dr. Muniz  Schedule an appointment as soon as possible for a visit                Diane Donahue PA-C  02/04/23 1815

## 2023-06-30 ENCOUNTER — HOSPITAL ENCOUNTER (EMERGENCY)
Facility: OTHER | Age: 43
Discharge: HOME | End: 2023-06-30
Attending: EMERGENCY MEDICINE
Payer: MEDICAID

## 2023-06-30 VITALS
HEIGHT: 63 IN | SYSTOLIC BLOOD PRESSURE: 149 MMHG | DIASTOLIC BLOOD PRESSURE: 91 MMHG | BODY MASS INDEX: 23.04 KG/M2 | RESPIRATION RATE: 16 BRPM | HEART RATE: 99 BPM | OXYGEN SATURATION: 100 % | WEIGHT: 130 LBS | TEMPERATURE: 98 F

## 2023-06-30 DIAGNOSIS — G89.18 POST-OP PAIN: Primary | ICD-10-CM

## 2023-06-30 DIAGNOSIS — M25.571 ACUTE RIGHT ANKLE PAIN: ICD-10-CM

## 2023-06-30 LAB
B-HCG UR QL: NEGATIVE
CTP QC/QA: YES

## 2023-06-30 PROCEDURE — 25000003 PHARM REV CODE 250

## 2023-06-30 PROCEDURE — 96372 THER/PROPH/DIAG INJ SC/IM: CPT

## 2023-06-30 PROCEDURE — 81025 URINE PREGNANCY TEST: CPT

## 2023-06-30 PROCEDURE — 99284 EMERGENCY DEPT VISIT MOD MDM: CPT

## 2023-06-30 PROCEDURE — 63600175 PHARM REV CODE 636 W HCPCS

## 2023-06-30 RX ORDER — OXYCODONE AND ACETAMINOPHEN 5; 325 MG/1; MG/1
1 TABLET ORAL EVERY 6 HOURS PRN
Qty: 9 TABLET | Refills: 0 | Status: SHIPPED | OUTPATIENT
Start: 2023-06-30 | End: 2023-07-03

## 2023-06-30 RX ORDER — OXYCODONE AND ACETAMINOPHEN 5; 325 MG/1; MG/1
2 TABLET ORAL
Status: COMPLETED | OUTPATIENT
Start: 2023-06-30 | End: 2023-06-30

## 2023-06-30 RX ORDER — KETOROLAC TROMETHAMINE 30 MG/ML
30 INJECTION, SOLUTION INTRAMUSCULAR; INTRAVENOUS
Status: COMPLETED | OUTPATIENT
Start: 2023-06-30 | End: 2023-06-30

## 2023-06-30 RX ORDER — KETOROLAC TROMETHAMINE 10 MG/1
10 TABLET, FILM COATED ORAL EVERY 6 HOURS
Qty: 20 TABLET | Refills: 0 | Status: SHIPPED | OUTPATIENT
Start: 2023-06-30 | End: 2023-07-05

## 2023-06-30 RX ADMIN — OXYCODONE HYDROCHLORIDE AND ACETAMINOPHEN 2 TABLET: 5; 325 TABLET ORAL at 03:06

## 2023-06-30 RX ADMIN — KETOROLAC TROMETHAMINE 30 MG: 30 INJECTION, SOLUTION INTRAMUSCULAR; INTRAVENOUS at 03:06

## 2023-06-30 NOTE — DISCHARGE INSTRUCTIONS
Please stop hydrocodone.  Please start taking oxycodone as needed for pain.  Please try Toradol as well for pain.  Please follow-up with Dr. Newsome on Monday morning.

## 2023-06-30 NOTE — ED PROVIDER NOTES
Encounter Date: 2023       History     Chief Complaint   Patient presents with    Post-op Problem     C/o right ankle pain 10/10 and swelling s/t ligament repair surgery on . Denies any drainage. Right foot noted in walking boot. VSS     43-year-old female presents to the emergency department for evaluation of right ankle pain and swelling status post repair of ankle ligament on 23 at [facility] by Dr. Sunny Newsome. Patient reports the pain and swelling have worsened since the surgery. She denies recent falls or trauma to the ankle since the surgery. Reports the area is very sensitive to touch.  No pain relief with hydrocodone 10s at home, last dose yesterday evening.  She has been keeping the wound clean. No oozing or drainage. No redness or temperature change. She has been keeping the ace wrap around the ankle and moves around with a walking boot and crutches. She denies fever, chills, or paresthesias.     The history is provided by the patient.   Review of patient's allergies indicates:   Allergen Reactions    Nsaids (non-steroidal anti-inflammatory drug)      Past Medical History:   Diagnosis Date    Asthma     Migraine headache      Past Surgical History:   Procedure Laterality Date     SECTION       History reviewed. No pertinent family history.  Social History     Tobacco Use    Smoking status: Never    Smokeless tobacco: Never   Substance Use Topics    Alcohol use: No    Drug use: Yes     Types: Marijuana     Comment: prescribed THC     Review of Systems   Constitutional:  Negative for chills and fever.   HENT:  Negative for congestion, rhinorrhea and sore throat.    Respiratory:  Negative for cough and shortness of breath.    Cardiovascular:  Negative for chest pain.   Gastrointestinal:  Negative for abdominal pain, diarrhea, nausea and vomiting.   Genitourinary:  Negative for dysuria, frequency and urgency.   Musculoskeletal:         Positive for right ankle pain and  swelling.   Skin:  Positive for wound (right lateral ankle).   Neurological:  Negative for dizziness, numbness and headaches.   Psychiatric/Behavioral:  Negative for confusion.      Physical Exam     Initial Vitals [06/30/23 1435]   BP Pulse Resp Temp SpO2   (!) 149/91 99 17 98.2 °F (36.8 °C) 100 %      MAP       --         Physical Exam    Vitals reviewed.  Constitutional: She appears well-developed and well-nourished. No distress.   HENT:   Head: Normocephalic and atraumatic.   Eyes: Conjunctivae and EOM are normal.   Neck: Neck supple.   Normal range of motion.  Cardiovascular:  Normal rate, regular rhythm, normal heart sounds and intact distal pulses.           Pulses:       Dorsalis pedis pulses are 2+ on the right side.        Posterior tibial pulses are 2+ on the right side.   Pulmonary/Chest: Breath sounds normal. No respiratory distress. She has no wheezes. She has no rhonchi. She has no rales.   Abdominal: Abdomen is soft. Bowel sounds are normal. She exhibits no distension. There is no abdominal tenderness. There is no rebound and no guarding.   Musculoskeletal:      Cervical back: Normal range of motion and neck supple.      Comments: Limited ROM of the right ankle secondary to pain. Compartments are soft. Edema and tenderness to palpation of the right ankle and proximal foot.      Neurological: She is alert and oriented to person, place, and time. She has normal strength.   Sensation intact to light touch. Neurovascularly intact.    Skin: Skin is warm and dry. Capillary refill takes less than 2 seconds. No erythema.   Well healing surgical wound to right lateral ankle. No oozing or drainage. No erythema or overlying skin changes.    Psychiatric: She has a normal mood and affect. Her behavior is normal. Judgment and thought content normal.       ED Course   Procedures  Labs Reviewed   POCT URINE PREGNANCY          Imaging Results    None          Medications   ketorolac injection 30 mg (30 mg  Intramuscular Given 6/30/23 1519)   oxyCODONE-acetaminophen 5-325 mg per tablet 2 tablet (2 tablets Oral Given 6/30/23 1519)     Medical Decision Making:   Initial Assessment:   Urgent evaluation of 43-year-old female who presents emergency department for evaluation of right ankle pain and swelling status post modified Brostrom procedure on 6/13/23 at [facility], which was performed by Dr. Chris Newsome.  No pain relief with prescribed hydrocodone 10s by her orthopedic surgeon.  Her last dose was yesterday evening.  No fever or chills. No paresthesias, redness, or warmth to the area.  She has been ambulating with a walking boot and crutches. She has been afebrile. On exam, edema and tenderness to palpation to the right ankle. Well healing surgical scar. No surrounding erythema or skin changes. No signs of cellulitis. Good pulses and cap refill.  Will give analgesics.  Will discuss case with Dr. Newsome.  ED Management:  Case discussed with Dr. Newsome's staff member.  He is out of the office this afternoon.  She will put the patient on his clinic list for Monday morning. On reassessment, patient feeling much better after receiving Toradol and Percocet 10.  Will provide Toradol tablets and 3 day supply of Percocet 5 as approved by my supervising physician.  I updated the patient on plan of care. She verbalized understanding and agreement with this plan of care.  She was given specific return precautions.  All questions and concerns addressed.  She is stable for discharge.                        Clinical Impression:   Final diagnoses:  [G89.18] Post-op pain (Primary)  [M25.571] Acute right ankle pain        ED Disposition Condition    Discharge Stable          ED Prescriptions       Medication Sig Dispense Start Date End Date Auth. Provider    oxyCODONE-acetaminophen (PERCOCET) 5-325 mg per tablet Take 1 tablet by mouth every 6 (six) hours as needed for Pain. 9 tablet 6/30/2023 7/3/2023 Seferino Salas PA-C     ketorolac (TORADOL) 10 mg tablet Take 1 tablet (10 mg total) by mouth every 6 (six) hours. for 5 days 20 tablet 6/30/2023 7/5/2023 Seferino Salas PA-C          Follow-up Information    None          Seferino Salas PA-C  06/30/23 3988

## 2023-07-14 ENCOUNTER — HOSPITAL ENCOUNTER (EMERGENCY)
Facility: OTHER | Age: 43
Discharge: HOME | End: 2023-07-14
Attending: EMERGENCY MEDICINE
Payer: MEDICAID

## 2023-07-14 VITALS
TEMPERATURE: 98 F | BODY MASS INDEX: 23.56 KG/M2 | SYSTOLIC BLOOD PRESSURE: 116 MMHG | RESPIRATION RATE: 17 BRPM | WEIGHT: 133 LBS | HEART RATE: 66 BPM | DIASTOLIC BLOOD PRESSURE: 81 MMHG | OXYGEN SATURATION: 96 %

## 2023-07-14 DIAGNOSIS — R20.2 PARESTHESIAS: Primary | ICD-10-CM

## 2023-07-14 DIAGNOSIS — M54.2 NECK PAIN: ICD-10-CM

## 2023-07-14 LAB
B-HCG UR QL: NEGATIVE
CTP QC/QA: YES

## 2023-07-14 PROCEDURE — 81025 URINE PREGNANCY TEST: CPT | Performed by: EMERGENCY MEDICINE

## 2023-07-14 PROCEDURE — 25000003 PHARM REV CODE 250: Performed by: EMERGENCY MEDICINE

## 2023-07-14 PROCEDURE — 99284 EMERGENCY DEPT VISIT MOD MDM: CPT | Mod: 25

## 2023-07-14 RX ORDER — ACETAMINOPHEN 325 MG/1
650 TABLET ORAL EVERY 6 HOURS PRN
Qty: 30 TABLET | Refills: 0 | OUTPATIENT
Start: 2023-07-14 | End: 2024-01-04

## 2023-07-14 RX ORDER — METHOCARBAMOL 750 MG/1
1500 TABLET, FILM COATED ORAL
Status: COMPLETED | OUTPATIENT
Start: 2023-07-14 | End: 2023-07-14

## 2023-07-14 RX ORDER — METHOCARBAMOL 750 MG/1
TABLET, FILM COATED ORAL
Qty: 30 TABLET | Refills: 0 | OUTPATIENT
Start: 2023-07-14 | End: 2024-01-04

## 2023-07-14 RX ORDER — ALPRAZOLAM 0.5 MG/1
0.5 TABLET ORAL
Status: DISCONTINUED | OUTPATIENT
Start: 2023-07-14 | End: 2023-07-15 | Stop reason: HOSPADM

## 2023-07-14 RX ORDER — ORPHENADRINE CITRATE 30 MG/ML
60 INJECTION INTRAMUSCULAR; INTRAVENOUS
Status: DISCONTINUED | OUTPATIENT
Start: 2023-07-14 | End: 2023-07-14

## 2023-07-14 RX ORDER — ACETAMINOPHEN 500 MG
1000 TABLET ORAL
Status: COMPLETED | OUTPATIENT
Start: 2023-07-14 | End: 2023-07-14

## 2023-07-14 RX ADMIN — ACETAMINOPHEN 1000 MG: 500 TABLET, FILM COATED ORAL at 08:07

## 2023-07-14 RX ADMIN — ALPRAZOLAM 0.5 MG: 0.5 TABLET ORAL at 09:07

## 2023-07-14 RX ADMIN — METHOCARBAMOL 1500 MG: 750 TABLET ORAL at 08:07

## 2023-07-15 NOTE — ED PROVIDER NOTES
"  Source of History:  Medical record, patient    Chief complaint:  Per triage note: "Fall (Pt presents with c/o pain "everywhere" after falling at home. Pt reports that she was walking with her crutches when her crutch slid in water, causing her to fall. She is post op R ankle surgery for torn ligament on . Fell onto her back, c/o head, back, and neck pain. Rates as 9/10. Denies use of blood thinners. Denies LOC. + HA + Dizziness.)  "    HPI:    Patient presents to the ED presents with paresthesias at her bilateral upper extremities since mechanical trip at approximately 1700 hours. Patient reports falling onto her back hitting her right arm on the counter then landing on her back. She reports associated back pain, neck pain, headache. She does notes paresthesias are more severe to the right 3rd digit, index, and thumb. She denies syncope.  She is not taken any analgesics.  This is the extent of the patient's complaints at this time.      ROS:   See HPI for pertinent Review of Systems      Review of patient's allergies indicates:   Allergen Reactions    Nsaids (non-steroidal anti-inflammatory drug)        PMH:  As per HPI and below:  Past Medical History:   Diagnosis Date    Asthma     Migraine headache        Past Surgical History:   Procedure Laterality Date    ANKLE SURGERY Right      SECTION         Social History     Tobacco Use    Smoking status: Never    Smokeless tobacco: Never   Substance Use Topics    Alcohol use: No    Drug use: Yes     Types: Marijuana     Comment: prescribed THC       Physical Exam:        Nursing note and vitals reviewed.  /80   Pulse 96   Temp 98.4 °F (36.9 °C) (Oral)   Resp 17   Wt 60.3 kg (133 lb)   LMP 2023 (Approximate)   SpO2 99%   Breastfeeding No   BMI 23.56 kg/m²     Constitutional:  Awake, alert. No distress. CAM boot on right leg.   Eyes: EOMI. No discharge. Anicteric.  HENT:  No lacerations, contusions, or abrasions on close " inspection.  Neck: Normal range of motion. Neck supple.  midline spinal tenderness, no step-offs, no deformities.  Cardiovascular: Normal rate. No murmur, no gallop and no friction rub heard.   Pulmonary/Chest: No respiratory distress. Effort normal. No wheezes, no rales, no rhonchi.   Abdominal: Bowel sounds normal. Soft. No distension and no mass. There is no tenderness. There is no rebound, no guarding, no tenderness at McBurney's point.  Musculoskeletal: Normal range of motion.  No bony tenderness at large joints or long bones.  High thoracic midline spinal tenderness. No step-offs. No deformities.  Neurological:  GCS 15. Awake, alert, oriented. No gross cranial nerve, light touch or strength deficit. Coordination normal.   Skin: Skin is warm and dry.   EXT: 2+ radial pulses.     Right upper extremity: Skin intact. No point tenderness.   Reports similar degree of decreased of sensation to light touch of her left digits 2-5. Otherwise Light touch intact in axillary / median / radial / ulnar distributions.   Full active and passive range of motion at shoulder/ elbow/ wrist.   5/5 strength at biceps, elbows, wrists, thumb add/abduction, finger flexion/extension.   2+ radial pulses.   Capillary refill <2sec.   Compartments soft.     Psychiatric: Behavior is normal. Judgment normal.        Medical Decision Making / Independent Interpretations / External Records Reviewed:      ED Course as of 07/14/23 2334 Fri Jul 14, 2023 2016 Patient is a 43-year-old female with asthma, migraines history of left upper extremity paresthesias, cervical radiculopathy who presents with bilateral or extremity paresthesias worse at left digits 2-5 after mechanical trip and fall at approx 1630 hrs.  She denies any focal strength deficits.  She complains of paraspinal neck and upper back pain, headache since the fall. She has not taken any analgesics.   On exam, patient reports normal sensation over left upper extremity, reports similar  "degree of decreased of sensation to light touch of her left digits 2-5, motor strength intact distally, has midline cervical and upper sick spine tenderness.   Initial differential included vertebral fracture, cord injury (but she denies other neurologic complaints).  We will obtain CTs of thoracic and cervical spine.  If her symptoms persist with negative CT, will obtain MRI.   [RC]   2102 I independently reviewed and interpreted CT head and cervical spine which show no acute intracranial bleeding, mass, skull fracture, acute intracranial process, vertebral fracture, or cord injury.  Pt reports her symptoms are persistent, now having paresthesias of her thumb.   I ordered cervical spine MRI.  [RC]   2113 Patient history, findings, results, patient management discussed with radiologist Dr. Odell regarding appropriate MR imaging.  [RC]   2320 I independently interpreted and reviewed the patient's MRI, notable for multilevel cervical spondylosis, "no cervical cord signal abnormality seen."     [RC]   2329 Patient reports her paresthesias are much improved.  She feels comfortable with discharge.  She states that she has a follow-up appointment already scheduled with neurophysiology at [facility].  --  I discussed with pt and/or guardian/caretaker that this evaluation in the ED does not suggest any emergent or life threatening medical condition requiring admission or further immediate intervention or diagnostics. Regardless, an unremarkable evaluation in the ED does not preclude the development or presence of a serious or life threatening condition. Pt was instructed to return for any worsening, new, changed, or concerning symptoms.     I had a detailed discussion with patient and/or guardian/caretaker regarding findings, plan, return precautions, importance of medication adherence, need to follow-up with a PCP and specialist. All questions answered.     Note was created using voice recognition software. It may have " occasional typographical errors not identified and edited despite initial review prior to signing.   [RC]      ED Course User Index  [RC] Leonid Betancourt MD       I decided to obtain the patient's medical records. I reviewed patient's prior external notes / results: specialist note (orthopedics, PM&R).     Medications - No data to display         ---  I, Vania Mckinney, scribed for, and in the presence of, Dr. Betancourt. I performed the scribed service and the documentation accurately describes the services I performed. I attest to the accuracy of the note.     Physician Attestation for Scribe:   I, Leonid Betancourt MD, reviewed documentation as scribed in my presence, which is both accurate and complete.    Diagnostic Impression:    1. Paresthesias    2. Neck pain          No future appointments.           Leonid Betancourt MD  07/14/23 2973

## 2023-07-15 NOTE — ED TRIAGE NOTES
Pt presents to the ER with complaints of ground level fall with crutches. Pt reports falling back and hitting her back head and neck on the ground. Pt denies loc and denies being on blood thinners. Pt reports having a HA, lower back pain, neck pain, and dizziness. Pt reports pain a 9/10. Pt is post op from a right sided ankle surgery after a torn ligament in June.

## 2023-07-15 NOTE — DISCHARGE INSTRUCTIONS

## 2023-07-19 NOTE — PROGRESS NOTES
ED Navigator unable to reach patient for ED Navigation services x's 3 attempts. Closing encounter.

## 2023-07-23 ENCOUNTER — HOSPITAL ENCOUNTER (EMERGENCY)
Facility: OTHER | Age: 43
Discharge: HOME | End: 2023-07-23
Attending: EMERGENCY MEDICINE
Payer: COMMERCIAL

## 2023-07-23 VITALS
DIASTOLIC BLOOD PRESSURE: 87 MMHG | HEIGHT: 63 IN | HEART RATE: 87 BPM | TEMPERATURE: 98 F | RESPIRATION RATE: 20 BRPM | OXYGEN SATURATION: 100 % | BODY MASS INDEX: 23.57 KG/M2 | WEIGHT: 133 LBS | SYSTOLIC BLOOD PRESSURE: 133 MMHG

## 2023-07-23 DIAGNOSIS — S39.012A BACK STRAIN, INITIAL ENCOUNTER: Primary | ICD-10-CM
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
## 2023-07-23 LAB
B-HCG UR QL: NEGATIVE
CTP QC/QA: YES

## 2023-07-23 PROCEDURE — 25000003 PHARM REV CODE 250: Performed by: PHYSICIAN ASSISTANT

## 2023-07-23 PROCEDURE — 81025 URINE PREGNANCY TEST: CPT | Performed by: PHYSICIAN ASSISTANT

## 2023-07-23 PROCEDURE — 96372 THER/PROPH/DIAG INJ SC/IM: CPT | Performed by: PHYSICIAN ASSISTANT

## 2023-07-23 PROCEDURE — 99284 EMERGENCY DEPT VISIT MOD MDM: CPT | Mod: 25

## 2023-07-23 PROCEDURE — 63600175 PHARM REV CODE 636 W HCPCS: Performed by: PHYSICIAN ASSISTANT

## 2023-07-23 RX ORDER — KETOROLAC TROMETHAMINE 30 MG/ML
30 INJECTION, SOLUTION INTRAMUSCULAR; INTRAVENOUS
Status: COMPLETED | OUTPATIENT
Start: 2023-07-23 | End: 2023-07-23

## 2023-07-23 RX ORDER — METHOCARBAMOL 500 MG/1
1000 TABLET, FILM COATED ORAL 3 TIMES DAILY PRN
Qty: 18 TABLET | Refills: 0 | Status: SHIPPED | OUTPATIENT
Start: 2023-07-23 | End: 2023-07-28

## 2023-07-23 RX ORDER — METHOCARBAMOL 500 MG/1
1000 TABLET, FILM COATED ORAL
Status: COMPLETED | OUTPATIENT
Start: 2023-07-23 | End: 2023-07-23

## 2023-07-23 RX ADMIN — METHOCARBAMOL 1000 MG: 500 TABLET ORAL at 03:07

## 2023-07-23 RX ADMIN — KETOROLAC TROMETHAMINE 30 MG: 30 INJECTION, SOLUTION INTRAMUSCULAR; INTRAVENOUS at 03:07
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
## 2023-09-06 ENCOUNTER — HOSPITAL ENCOUNTER (EMERGENCY)
Facility: OTHER | Age: 43
Discharge: HOME | End: 2023-09-06
Attending: EMERGENCY MEDICINE
Payer: MEDICAID

## 2023-09-06 VITALS
BODY MASS INDEX: 23.92 KG/M2 | WEIGHT: 135 LBS | SYSTOLIC BLOOD PRESSURE: 116 MMHG | HEIGHT: 63 IN | HEART RATE: 80 BPM | DIASTOLIC BLOOD PRESSURE: 72 MMHG | RESPIRATION RATE: 18 BRPM | TEMPERATURE: 99 F | OXYGEN SATURATION: 99 %

## 2023-09-06 DIAGNOSIS — M25.511 ACUTE PAIN OF RIGHT SHOULDER: Primary | ICD-10-CM

## 2023-09-06 DIAGNOSIS — R52 PAIN: ICD-10-CM

## 2023-09-06 PROCEDURE — 25000003 PHARM REV CODE 250: Performed by: EMERGENCY MEDICINE

## 2023-09-06 PROCEDURE — 99283 EMERGENCY DEPT VISIT LOW MDM: CPT

## 2023-09-06 RX ORDER — OXYCODONE HYDROCHLORIDE 5 MG/1
10 TABLET ORAL
Status: COMPLETED | OUTPATIENT
Start: 2023-09-06 | End: 2023-09-06

## 2023-09-06 RX ADMIN — OXYCODONE HYDROCHLORIDE 10 MG: 5 TABLET ORAL at 08:09

## 2023-09-06 NOTE — ED NOTES
Pt currently denies any possibility that she could be pregnant at this encounter. LMP approx. 08/30.

## 2023-09-06 NOTE — ED PROVIDER NOTES
Encounter Date: 2023    SCRIBE #1 NOTE: I, Velasquez Whitfield, am scribing for, and in the presence of,  Lore Gaviria MD. I have scribed the following portions of the note - Other sections scribed: HPI, ROS, PE.       History     Chief Complaint   Patient presents with    Shoulder Pain     R shoulder and MORALES pain after reaching out with RUE to open car door. 10/10 pain.      Time seen by provider: 8:51 AM    This is a 43 y.o. female who presents via EMS with complaint of right shoulder pain that began this morning. She is currently in a right shoulder sling and a right foot boot from a previous injury. She states that she was getting into her car when she heard a loud pop in her shoulder. The patient is ambidextrous. She states that EMS did not give her any medication for her pain. She is currently taking percocet for her ankle pain, but did not take any this morning. Patient denies any other complaints at this time.    The history is provided by the patient.     Review of patient's allergies indicates:   Allergen Reactions    Nsaids (non-steroidal anti-inflammatory drug)      History of GERD. Gets bad heartburn after NSAIDs even when GERD is well controlled.      Past Medical History:   Diagnosis Date    Asthma     Migraine headache      Past Surgical History:   Procedure Laterality Date    ANKLE SURGERY Right      SECTION       No family history on file.  Social History     Tobacco Use    Smoking status: Never    Smokeless tobacco: Never   Substance Use Topics    Alcohol use: No    Drug use: Yes     Types: Marijuana     Comment: prescribed THC     Review of Systems   Constitutional:  Negative for activity change, appetite change, chills, diaphoresis and fever.   HENT:  Negative for congestion, sore throat and trouble swallowing.    Eyes:  Negative for photophobia and visual disturbance.   Respiratory:  Negative for cough, chest tightness and shortness of breath.    Cardiovascular:  Negative for chest pain.    Gastrointestinal:  Negative for abdominal pain, nausea and vomiting.   Endocrine: Negative for polydipsia and polyuria.   Genitourinary:  Negative for difficulty urinating and flank pain.   Musculoskeletal:  Positive for arthralgias. Negative for back pain and neck pain.   Skin:  Negative for rash.   Neurological:  Negative for weakness and headaches.   Psychiatric/Behavioral:  Negative for confusion.        Physical Exam     Initial Vitals   BP Pulse Resp Temp SpO2   09/06/23 0846 09/06/23 0846 09/06/23 0845 09/06/23 0845 09/06/23 0846   127/76 75 20 98.6 °F (37 °C) 100 %      MAP       --                Physical Exam    Nursing note and vitals reviewed.  Constitutional: She appears well-developed. She is cooperative.   HENT:   Head: Atraumatic.   Eyes: Conjunctivae and lids are normal.   Neck:   Normal range of motion.  Cardiovascular:  Normal rate.           Pulmonary/Chest: No respiratory distress.   Musculoskeletal:         General: Tenderness present.      Cervical back: Normal range of motion.      Comments: Holding right upper extremity close to body, normal shoulder contour, normal ranging of right wrist tenderness to medial scapular region on right.     Neurological: She is alert.   Skin: No rash noted.   Psychiatric: She has a normal mood and affect. Her speech is normal and behavior is normal.         ED Course   Procedures  Labs Reviewed - No data to display       Imaging Results              X-Ray Shoulder Trauma 3 view Right (Final result)  Result time 09/06/23 09:54:24      Final result by Amelia Shahid MD (09/06/23 09:54:24)                   Impression:      No acute osseous abnormality seen.      Electronically signed by: Amelia Shahid  Date:    09/06/2023  Time:    09:54               Narrative:    EXAMINATION:  XR SHOULDER TRAUMA 3 VIEW RIGHT    CLINICAL HISTORY:  ro dislocation;    TECHNIQUE:  Three or four views of the right shoulder were  performed.    COMPARISON:  09/06/2023    FINDINGS:  No acute fracture or dislocation seen.  No significant soft tissue edema or radiopaque retained foreign body.    Mild degenerative change acromioclavicular joint.                                       X-Ray Shoulder Complete 2 View Right (Final result)  Result time 09/06/23 09:22:21      Final result by Romero Willard MD (09/06/23 09:22:21)                   Impression:      Limited two view series of the right lower with suboptimal patient positioning.    Anatomic alignment glenohumeral joint is not confirmed on provided images. Repeat three-view examination would be recommended to assess alignment and exclude the possibility of fracture.      Electronically signed by: Romero Willard  Date:    09/06/2023  Time:    09:22               Narrative:    EXAMINATION:  XR SHOULDER COMPLETE 2 OR MORE VIEWS RIGHT    CLINICAL HISTORY:  Pain, unspecified    TECHNIQUE:  Two or three views of the right shoulder were performed.    COMPARISON:  None    FINDINGS:  Limited two view series of the right lower with suboptimal patient positioning.    Anatomic alignment glenohumeral joint is not confirmed on provided images.  Repeat three-view examination would be recommended to assess alignment and exclude the possibility of fracture.    No definite radiopaque foreign body.    No definite acute findings in the visualized portions of the chest or abdomen.                                    X-Rays:   Independently Interpreted Readings:   Other Readings:  RIGHT SHOULDER:  Normal alignment, no dislocation, no obvious fracture.    Medications   oxyCODONE immediate release tablet 10 mg (10 mg Oral Given 9/6/23 0854)     Medical Decision Making  Patient made aware of reassuring imaging, encouraged physical therapy, with MRI imaging to evaluate for rotator cuff injury if pain not improved.    Amount and/or Complexity of Data Reviewed  Radiology: ordered.    Risk  Prescription drug  management.            Scribe Attestation:   Scribe #1: I performed the above scribed service and the documentation accurately describes the services I performed. I attest to the accuracy of the note.        ED Course as of 09/06/23 1031   Wed Sep 06, 2023   0852 Urgent evaluation a 43-year-old female here with acute onset pain to the right shoulder.  Discomfort occurred while she was reaching in her car, twisting to release her crutches in the setting of recent right ankle fracture.  Patient has no history of prior shoulder dislocations.  Now pain noted to the right shoulder, with radiation to the scapula.  Patient is able to range digits of right hand, normal neurovascular exam distal to injury.  No obvious dislocation present.  Will plan to obtain imaging and reassess. [DM]      ED Course User Index  [DM] Lore Gaviria MD     Physician Attestation for Scribe: I, Everette, reviewed documentation as scribed in my presence, which is both accurate and complete.           Medical Decision Making:   History:   Old Medical Records: I decided to obtain old medical records.  Independently Interpreted Test(s):   I have ordered and independently interpreted X-rays - see prior notes.  Clinical Tests:   Radiological Study: Ordered and Reviewed      Clinical Impression:   Final diagnoses:  [R52] Pain  [M25.511] Acute pain of right shoulder (Primary)        ED Disposition Condition    Discharge Stable          ED Prescriptions    None       Follow-up Information       Follow up With Specialties Details Why Contact Info    YOUR PCP  Schedule an appointment as soon as possible for a visit today If symptoms worsen              Lore Gaviria MD  09/06/23 1031

## 2023-09-06 NOTE — ED TRIAGE NOTES
Pt reports that she was getting into her car and putting her crutches into the other seat when she felt a pop in her shoulder. She is currently wearing an ortho shoe to the left foot and using crutches after foot surgery. She arrived with a make-shift sling per EMS.

## 2023-12-23 ENCOUNTER — HOSPITAL ENCOUNTER (EMERGENCY)
Facility: HOSPITAL | Age: 43
Discharge: HOME | End: 2023-12-24
Attending: EMERGENCY MEDICINE
Payer: MEDICAID
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
## 2023-12-23 DIAGNOSIS — S16.1XXA CERVICAL STRAIN, ACUTE, INITIAL ENCOUNTER: ICD-10-CM

## 2023-12-23 DIAGNOSIS — M25.572 ACUTE LEFT ANKLE PAIN: ICD-10-CM

## 2023-12-23 DIAGNOSIS — T07.XXXA ABRASIONS OF MULTIPLE SITES: ICD-10-CM

## 2023-12-23 DIAGNOSIS — T14.90XA TRAUMA: ICD-10-CM

## 2023-12-23 PROCEDURE — 99285 EMERGENCY DEPT VISIT HI MDM: CPT

## 2023-12-24 VITALS
SYSTOLIC BLOOD PRESSURE: 131 MMHG | BODY MASS INDEX: 22.5 KG/M2 | RESPIRATION RATE: 18 BRPM | TEMPERATURE: 98 F | OXYGEN SATURATION: 99 % | WEIGHT: 127 LBS | DIASTOLIC BLOOD PRESSURE: 80 MMHG | HEART RATE: 85 BPM

## 2023-12-24 LAB
B-HCG UR QL: NEGATIVE
CTP QC/QA: YES

## 2023-12-24 PROCEDURE — 90715 TDAP VACCINE 7 YRS/> IM: CPT | Performed by: EMERGENCY MEDICINE

## 2023-12-24 PROCEDURE — 63600175 PHARM REV CODE 636 W HCPCS: Performed by: EMERGENCY MEDICINE

## 2023-12-24 PROCEDURE — 25000003 PHARM REV CODE 250: Performed by: EMERGENCY MEDICINE

## 2023-12-24 PROCEDURE — 81025 URINE PREGNANCY TEST: CPT | Performed by: STUDENT IN AN ORGANIZED HEALTH CARE EDUCATION/TRAINING PROGRAM

## 2023-12-24 PROCEDURE — 90471 IMMUNIZATION ADMIN: CPT | Performed by: EMERGENCY MEDICINE

## 2023-12-24 RX ORDER — ACETAMINOPHEN 500 MG
1000 TABLET ORAL
Status: COMPLETED | OUTPATIENT
Start: 2023-12-24 | End: 2023-12-24

## 2023-12-24 RX ORDER — LORAZEPAM 1 MG/1
1 TABLET ORAL
Status: COMPLETED | OUTPATIENT
Start: 2023-12-24 | End: 2023-12-24

## 2023-12-24 RX ADMIN — TETANUS TOXOID, REDUCED DIPHTHERIA TOXOID AND ACELLULAR PERTUSSIS VACCINE, ADSORBED 0.5 ML: 5; 2.5; 8; 8; 2.5 SUSPENSION INTRAMUSCULAR at 02:12

## 2023-12-24 RX ADMIN — ACETAMINOPHEN 1000 MG: 500 TABLET ORAL at 01:12

## 2023-12-24 RX ADMIN — LORAZEPAM 1 MG: 1 TABLET ORAL at 01:12
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
## 2023-12-24 NOTE — DISCHARGE INSTRUCTIONS
Your imaging does not show anything broken. You will be very sore for the next 14 days. You can take Tylenol 1 gram every 8 hours, and ibuprofen 800 mg every 8 hours; this means you can take pain medicine once every 4 hours.    Please follow up with your doctor to make sure you are healing properly.

## 2024-01-03 ENCOUNTER — HOSPITAL ENCOUNTER (EMERGENCY)
Facility: OTHER | Age: 44
Discharge: HOME | End: 2024-01-04
Attending: EMERGENCY MEDICINE
Payer: MEDICAID

## 2024-01-03 DIAGNOSIS — J45.901 EXACERBATION OF ASTHMA, UNSPECIFIED ASTHMA SEVERITY, UNSPECIFIED WHETHER PERSISTENT: Primary | ICD-10-CM

## 2024-01-03 DIAGNOSIS — R07.89 CHEST TIGHTNESS: ICD-10-CM

## 2024-01-03 DIAGNOSIS — R09.02 HYPOXIA: ICD-10-CM

## 2024-01-03 DIAGNOSIS — M79.10 MYALGIA: ICD-10-CM

## 2024-01-03 DIAGNOSIS — R11.0 NAUSEA: ICD-10-CM

## 2024-01-03 LAB
CTP QC/QA: YES
CTP QC/QA: YES
HCV AB SERPL QL IA: NEGATIVE
HIV 1+2 AB+HIV1 P24 AG SERPL QL IA: NEGATIVE
POC MOLECULAR INFLUENZA A AGN: NEGATIVE
POC MOLECULAR INFLUENZA B AGN: NEGATIVE
SARS-COV-2 RDRP RESP QL NAA+PROBE: NEGATIVE

## 2024-01-03 PROCEDURE — 93005 ELECTROCARDIOGRAM TRACING: CPT

## 2024-01-03 PROCEDURE — 86803 HEPATITIS C AB TEST: CPT | Performed by: EMERGENCY MEDICINE

## 2024-01-03 PROCEDURE — 25000242 PHARM REV CODE 250 ALT 637 W/ HCPCS: Performed by: EMERGENCY MEDICINE

## 2024-01-03 PROCEDURE — 96374 THER/PROPH/DIAG INJ IV PUSH: CPT

## 2024-01-03 PROCEDURE — 94640 AIRWAY INHALATION TREATMENT: CPT

## 2024-01-03 PROCEDURE — 93010 ELECTROCARDIOGRAM REPORT: CPT | Mod: ,,, | Performed by: INTERNAL MEDICINE

## 2024-01-03 PROCEDURE — 87389 HIV-1 AG W/HIV-1&-2 AB AG IA: CPT | Performed by: EMERGENCY MEDICINE

## 2024-01-03 PROCEDURE — 63600175 PHARM REV CODE 636 W HCPCS: Performed by: EMERGENCY MEDICINE

## 2024-01-03 PROCEDURE — 25000003 PHARM REV CODE 250: Performed by: EMERGENCY MEDICINE

## 2024-01-03 PROCEDURE — 96361 HYDRATE IV INFUSION ADD-ON: CPT

## 2024-01-03 PROCEDURE — 81025 URINE PREGNANCY TEST: CPT | Performed by: EMERGENCY MEDICINE

## 2024-01-03 PROCEDURE — 87635 SARS-COV-2 COVID-19 AMP PRB: CPT | Performed by: EMERGENCY MEDICINE

## 2024-01-03 PROCEDURE — 94644 CONT INHLJ TX 1ST HOUR: CPT

## 2024-01-03 PROCEDURE — 99285 EMERGENCY DEPT VISIT HI MDM: CPT | Mod: 25

## 2024-01-03 RX ORDER — ALBUTEROL SULFATE 2.5 MG/.5ML
2.5 SOLUTION RESPIRATORY (INHALATION)
Status: COMPLETED | OUTPATIENT
Start: 2024-01-03 | End: 2024-01-03

## 2024-01-03 RX ORDER — ONDANSETRON HYDROCHLORIDE 2 MG/ML
4 INJECTION, SOLUTION INTRAVENOUS
Status: COMPLETED | OUTPATIENT
Start: 2024-01-03 | End: 2024-01-03

## 2024-01-03 RX ORDER — SODIUM CHLORIDE 9 MG/ML
1000 INJECTION, SOLUTION INTRAVENOUS
Status: COMPLETED | OUTPATIENT
Start: 2024-01-03 | End: 2024-01-03

## 2024-01-03 RX ORDER — ACETAMINOPHEN 500 MG
1000 TABLET ORAL
Status: COMPLETED | OUTPATIENT
Start: 2024-01-03 | End: 2024-01-04

## 2024-01-03 RX ORDER — PREDNISONE 20 MG/1
60 TABLET ORAL
Status: COMPLETED | OUTPATIENT
Start: 2024-01-03 | End: 2024-01-04

## 2024-01-03 RX ADMIN — ALBUTEROL SULFATE 2.5 MG: 2.5 SOLUTION RESPIRATORY (INHALATION) at 10:01

## 2024-01-03 RX ADMIN — ONDANSETRON 4 MG: 2 INJECTION INTRAMUSCULAR; INTRAVENOUS at 10:01

## 2024-01-03 RX ADMIN — SODIUM CHLORIDE 1000 ML: 9 INJECTION, SOLUTION INTRAVENOUS at 10:01

## 2024-01-03 RX ADMIN — ALBUTEROL SULFATE 2.5 MG: 2.5 SOLUTION RESPIRATORY (INHALATION) at 11:01

## 2024-01-04 VITALS
OXYGEN SATURATION: 100 % | HEART RATE: 85 BPM | DIASTOLIC BLOOD PRESSURE: 63 MMHG | RESPIRATION RATE: 11 BRPM | SYSTOLIC BLOOD PRESSURE: 118 MMHG | TEMPERATURE: 99 F

## 2024-01-04 LAB
B-HCG UR QL: NEGATIVE
CTP QC/QA: YES

## 2024-01-04 PROCEDURE — 94645 CONT INHLJ TX EACH ADDL HOUR: CPT

## 2024-01-04 PROCEDURE — 96361 HYDRATE IV INFUSION ADD-ON: CPT

## 2024-01-04 PROCEDURE — 25000003 PHARM REV CODE 250: Performed by: EMERGENCY MEDICINE

## 2024-01-04 PROCEDURE — 25000242 PHARM REV CODE 250 ALT 637 W/ HCPCS: Performed by: EMERGENCY MEDICINE

## 2024-01-04 PROCEDURE — 63600175 PHARM REV CODE 636 W HCPCS: Performed by: EMERGENCY MEDICINE

## 2024-01-04 RX ORDER — ONDANSETRON 4 MG/1
4 TABLET, ORALLY DISINTEGRATING ORAL EVERY 4 HOURS PRN
Qty: 20 TABLET | Refills: 1 | Status: SHIPPED | OUTPATIENT
Start: 2024-01-04

## 2024-01-04 RX ORDER — MONTELUKAST SODIUM 10 MG/1
10 TABLET ORAL NIGHTLY
Qty: 30 TABLET | Refills: 3 | Status: SHIPPED | OUTPATIENT
Start: 2024-01-04

## 2024-01-04 RX ORDER — BUDESONIDE AND FORMOTEROL FUMARATE DIHYDRATE 160; 4.5 UG/1; UG/1
2 AEROSOL RESPIRATORY (INHALATION) EVERY 12 HOURS
Qty: 10.2 G | Refills: 3 | Status: SHIPPED | OUTPATIENT
Start: 2024-01-04

## 2024-01-04 RX ORDER — ALBUTEROL SULFATE 90 UG/1
2 AEROSOL, METERED RESPIRATORY (INHALATION) EVERY 4 HOURS PRN
Qty: 18 G | Refills: 3 | Status: SHIPPED | OUTPATIENT
Start: 2024-01-04 | End: 2025-01-03

## 2024-01-04 RX ORDER — SODIUM CHLORIDE 9 MG/ML
1000 INJECTION, SOLUTION INTRAVENOUS
Status: COMPLETED | OUTPATIENT
Start: 2024-01-04 | End: 2024-01-04

## 2024-01-04 RX ORDER — ACETAMINOPHEN 500 MG
1000 TABLET ORAL EVERY 6 HOURS PRN
Qty: 30 TABLET | Refills: 0 | Status: SHIPPED | OUTPATIENT
Start: 2024-01-04

## 2024-01-04 RX ORDER — ALBUTEROL SULFATE 2.5 MG/.5ML
2.5 SOLUTION RESPIRATORY (INHALATION)
Status: COMPLETED | OUTPATIENT
Start: 2024-01-04 | End: 2024-01-04

## 2024-01-04 RX ORDER — PREDNISONE 20 MG/1
60 TABLET ORAL DAILY
Qty: 12 TABLET | Refills: 0 | Status: SHIPPED | OUTPATIENT
Start: 2024-01-04 | End: 2024-01-08

## 2024-01-04 RX ADMIN — ALBUTEROL SULFATE 2.5 MG: 2.5 SOLUTION RESPIRATORY (INHALATION) at 01:01

## 2024-01-04 RX ADMIN — PREDNISONE 60 MG: 20 TABLET ORAL at 12:01

## 2024-01-04 RX ADMIN — SODIUM CHLORIDE 1000 ML: 9 INJECTION, SOLUTION INTRAVENOUS at 12:01

## 2024-01-04 RX ADMIN — ACETAMINOPHEN 1000 MG: 500 TABLET ORAL at 12:01

## 2024-01-04 NOTE — ED NOTES
RT @ bedside. Pt connected to cardiac monitor, BP cuff, and pulse oximeter. ED workup in progress. Call light within reach. Safety measures in place. Plan of care ongoing.

## 2024-01-04 NOTE — ED NOTES
Pt reports feeling better after breathing treatments. Currently trailing on room air. Oxygen saturation 100% on room air.

## 2024-01-04 NOTE — PROVIDER PROGRESS NOTES - EMERGENCY DEPT.
Patient signed out to me by Dr. Winter Catalan pending reassessment after neb tx.    44 yo female with asthma presented to [facility] with subjective fever and chills, shortness of breath, body aches, chest pain, fatigue/lightheadedness, and diarrhea.      Ddx includes viral URI such as COVID-19 or influenza, PNA, asthma exacerbation, other.      UPT negative.    EKG 21:54: NSR, HR 68. Normal axis. No ectopy. No STEMI.    CXR NAD.    Flu and COVID-19 negative.    Prior to my assessment, patient received nebulized albuterol 2.5mg x 3, PO APAP 1g, IV zofran 4mg, IV NS 1L x 2, and PO prednisone 60mg.      On my reexam, patient is resting comfortably.  She rouses to voice.    NC/AT EOMI neck supple  RRR, CTAB  Moving all extremities, no edema    Patient feels comfortable going home.  VSS.    Rx'ed Prednisone burst course by Dr. Catalan.  I have also refilled Albuterol MDI and Symbicort as well as Singulair.  I have rx'ed PRN Zofran and APAP.

## 2024-01-04 NOTE — ED PROVIDER NOTES
Encounter Date: 1/3/2024    SCRIBE #1 NOTE: I, Monica Augustin, am scribing for, and in the presence of,  Winter Catalan MD. I have scribed the following portions of the note - Other sections scribed: HPI, ROS, PE.       History     Chief Complaint   Patient presents with    Chills     Chest pain, anxiety, chills, n/v & sob. No distress in triage.      Time seen by provider: 9:21 PM    This is a 43 y.o. female who presents with complaint of vomiting today. Associated with subjective fever, diarrhea, and generalized body aches. She has not been able to tolerate liquids since onset. Patient denies any recent sick contact. This is the extent of the patient's complaints at this time.    The history is provided by the patient.     Review of patient's allergies indicates:   Allergen Reactions    Nsaids (non-steroidal anti-inflammatory drug)      History of GERD. Gets bad heartburn after NSAIDs even when GERD is well controlled.      Past Medical History:   Diagnosis Date    Asthma     Migraine headache      Past Surgical History:   Procedure Laterality Date    ANKLE SURGERY Right      SECTION       No family history on file.  Social History     Tobacco Use    Smoking status: Never    Smokeless tobacco: Never   Substance Use Topics    Alcohol use: No    Drug use: Yes     Types: Marijuana     Comment: prescribed THC     Review of Systems   Constitutional:  Positive for fever (subjective).   HENT:  Negative for sore throat.    Respiratory:  Negative for shortness of breath.    Cardiovascular:  Negative for chest pain.   Gastrointestinal:  Positive for diarrhea, nausea and vomiting.   Genitourinary:  Negative for dysuria.   Musculoskeletal:  Positive for myalgias. Negative for back pain.   Skin:  Negative for rash.   Neurological:  Negative for weakness.   Hematological:  Does not bruise/bleed easily.   All other systems reviewed and are negative.      Physical Exam     Initial Vitals [24]   BP Pulse Resp  Temp SpO2   (!) 141/82 85 18 98.7 °F (37.1 °C) 100 %      MAP       --         Physical Exam    Constitutional: She appears well-developed and well-nourished. She does not have a sickly appearance.   Appears uncomfortable.   HENT:   Head: Normocephalic and atraumatic.   Right Ear: External ear normal.   Left Ear: External ear normal.   Eyes: Conjunctivae, EOM and lids are normal. Right eye exhibits no discharge. Left eye exhibits no discharge. Right conjunctiva is not injected. Right conjunctiva has no hemorrhage. Left conjunctiva is not injected. Left conjunctiva has no hemorrhage. No scleral icterus.   Neck: Phonation normal. No stridor present. No tracheal deviation present.   Normal range of motion.  Cardiovascular:  Normal rate, regular rhythm and normal heart sounds.     Exam reveals no friction rub.       No murmur heard.  Pulses:       Radial pulses are 2+ on the right side and 2+ on the left side.        Dorsalis pedis pulses are 2+ on the right side and 2+ on the left side.   Pulmonary/Chest: Breath sounds normal. No respiratory distress. She has no wheezes. She has no rhonchi. She has no rales.   Abdominal: Abdomen is soft. She exhibits no distension.   Diffuse abdominal tenderness. There is no rebound and no guarding.   Musculoskeletal:      Cervical back: Normal range of motion.     Neurological: She is alert and oriented to person, place, and time. She has normal strength. GCS eye subscore is 4. GCS verbal subscore is 5. GCS motor subscore is 6.   Skin: Skin is warm.   Psychiatric: She has a normal mood and affect. Her speech is normal and behavior is normal. Judgment and thought content normal. Cognition and memory are normal.         ED Course   Procedures  Labs Reviewed   HIV 1 / 2 ANTIBODY    Narrative:     Release to patient->Immediate   HEPATITIS C ANTIBODY    Narrative:     Release to patient->Immediate   SARS-COV-2 RDRP GENE   POCT INFLUENZA A/B MOLECULAR   POCT URINE PREGNANCY     EKG  Readings: (Independently Interpreted)   Normal sinus rhythm. Rate of 68. Normal intervals. No ST or T wave changes.       Imaging Results              X-Ray Chest AP Portable (Final result)  Result time 01/04/24 00:42:02      Final result by Claire Ocasio MD (01/04/24 00:42:02)                   Impression:      No acute intrathoracic abnormality identified on this single radiographic view of the chest.      Electronically signed by: Claire Ocasio MD  Date:    01/04/2024  Time:    00:42               Narrative:    EXAMINATION:  XR CHEST AP PORTABLE    CLINICAL HISTORY:  Hypoxemia    TECHNIQUE:  Single frontal view of the chest was performed.    COMPARISON:  07/23/2023    FINDINGS:  Cardiac monitoring leads overlie the chest.  The cardiomediastinal silhouette is within normal limits of size and configuration.  Mediastinal structures are midline.  The lungs appear symmetrically expanded without definite evidence of confluent airspace consolidation, significant volume of pleural fluid or pneumothorax.  Visualized osseous structures are intact.  Bilateral nipple piercings in place.                                       Medications   0.9%  NaCl infusion (0 mLs Intravenous Stopped 1/3/24 2302)   ondansetron injection 4 mg (4 mg Intravenous Given 1/3/24 2205)   acetaminophen tablet 1,000 mg (1,000 mg Oral Given 1/4/24 0013)   albuterol sulfate nebulizer solution 2.5 mg (2.5 mg Nebulization Given 1/3/24 2202)   albuterol sulfate nebulizer solution 2.5 mg (2.5 mg Nebulization Given 1/3/24 2357)   predniSONE tablet 60 mg (60 mg Oral Given 1/4/24 0013)   0.9%  NaCl infusion (1,000 mLs Intravenous New Bag 1/4/24 0048)   albuterol sulfate nebulizer solution 2.5 mg (2.5 mg Nebulization Given 1/4/24 0115)     Medical Decision Making  43-year-old female with history of asthma normally well controlled presents primarily complaining of myalgias and nausea/vomiting.  In triage she also reported dyspnea.  On exam she appeared in no  respiratory distress and lungs were clear.  Initial oxygen saturation 100% on room air.  Given history of asthma and report of of dyspnea, an albuterol neb was given.  Subsequently she became hypoxic with oxygen levels dropping as low as 88% on room air.  After receiving 3 additional nebs and being placed briefly on supplemental oxygen, she has no 100% on air.  COVID and flu test both negative.  Chest x-ray pending.    Amount and/or Complexity of Data Reviewed  Labs: ordered. Decision-making details documented in ED Course.  Radiology: ordered.    Risk  OTC drugs.  Prescription drug management.            Scribe Attestation:   Scribe #1: I performed the above scribed service and the documentation accurately describes the services I performed. I attest to the accuracy of the note.    Physician Attestation for Scribe: I, Winter Catalan  , reviewed documentation as scribed in my presence, which is both accurate and complete.        ED Course as of 01/04/24 0119   Thu Jan 04, 2024   0044 On reassessment the patient remained 99% on room air while at rest and % after ambulation.  With ambulation her main complaint was dizziness.  Will give additional L of fluids and nebs and reassess. [AA]      ED Course User Index  [AA] Winter Catalan MD             1:19 AM  Patient's care will be transferred to Dr. Zuleta at this time pending reassessment.              Clinical Impression:  Final diagnoses:  [R07.89] Chest tightness  [R09.02] Hypoxia  [J45.901] Exacerbation of asthma, unspecified asthma severity, unspecified whether persistent (Primary)                 Winter Catalan MD  01/04/24 0120

## 2024-04-24 ENCOUNTER — HOSPITAL ENCOUNTER (EMERGENCY)
Facility: OTHER | Age: 44
Discharge: HOME | End: 2024-04-24
Attending: EMERGENCY MEDICINE
Payer: MEDICAID

## 2024-04-24 VITALS
SYSTOLIC BLOOD PRESSURE: 133 MMHG | TEMPERATURE: 98 F | WEIGHT: 130 LBS | HEIGHT: 62 IN | DIASTOLIC BLOOD PRESSURE: 76 MMHG | RESPIRATION RATE: 17 BRPM | BODY MASS INDEX: 23.92 KG/M2 | OXYGEN SATURATION: 100 % | HEART RATE: 70 BPM

## 2024-04-24 DIAGNOSIS — M25.571 CHRONIC PAIN OF RIGHT ANKLE: ICD-10-CM

## 2024-04-24 DIAGNOSIS — G89.29 CHRONIC PAIN OF RIGHT ANKLE: ICD-10-CM

## 2024-04-24 DIAGNOSIS — M25.561 ACUTE PAIN OF RIGHT KNEE: Primary | ICD-10-CM

## 2024-04-24 DIAGNOSIS — R52 PAIN: ICD-10-CM

## 2024-04-24 PROCEDURE — 99284 EMERGENCY DEPT VISIT MOD MDM: CPT | Mod: 25

## 2024-04-24 PROCEDURE — 25000003 PHARM REV CODE 250: Performed by: PHYSICIAN ASSISTANT

## 2024-04-24 RX ORDER — METHYLPREDNISOLONE 4 MG/1
TABLET ORAL
Qty: 21 EACH | Refills: 0 | Status: SHIPPED | OUTPATIENT
Start: 2024-04-24 | End: 2024-05-15

## 2024-04-24 RX ORDER — HYDROCODONE BITARTRATE AND ACETAMINOPHEN 5; 325 MG/1; MG/1
1 TABLET ORAL
Status: COMPLETED | OUTPATIENT
Start: 2024-04-24 | End: 2024-04-24

## 2024-04-24 RX ORDER — HYDROCODONE BITARTRATE AND ACETAMINOPHEN 5; 325 MG/1; MG/1
1 TABLET ORAL EVERY 6 HOURS PRN
Qty: 5 TABLET | Refills: 0 | Status: SHIPPED | OUTPATIENT
Start: 2024-04-24

## 2024-04-24 RX ORDER — LIDOCAINE 50 MG/G
1 PATCH TOPICAL
Status: DISCONTINUED | OUTPATIENT
Start: 2024-04-24 | End: 2024-04-24 | Stop reason: HOSPADM

## 2024-04-24 RX ADMIN — LIDOCAINE 5% 1 PATCH: 700 PATCH TOPICAL at 11:04

## 2024-04-24 RX ADMIN — HYDROCODONE BITARTRATE AND ACETAMINOPHEN 1 TABLET: 5; 325 TABLET ORAL at 11:04

## 2024-04-24 NOTE — ED PROVIDER NOTES
"  Source of History:  Patient     Chief complaint:  Leg Pain (R leg pain from hip to ankle. )      HPI:  Anay Louis is a 43 y.o. female presenting with right leg pain.  Patient denies any trauma.  She does report that she had surgery on right ankle for sprain in .  States that she does have some residual soreness to there.  States she was walking her dog this week and was jolted.  However denies any direct fall.  She states pain initially began in knee and now radiates to right lateral hip and right lateral ankle.  States that is worse with ambulation.  She denies any numbness, tingling or inability to bear full weight.  She has tried Ultram with no improvement.      This is the extent to the patients complaints today here in the emergency department.    ROS: As per HPI     Review of patient's allergies indicates:   Allergen Reactions    Nsaids (non-steroidal anti-inflammatory drug)      History of GERD. Gets bad heartburn after NSAIDs even when GERD is well controlled.        PMH:  As per HPI and below:  Past Medical History:   Diagnosis Date    Asthma     Migraine headache      Past Surgical History:   Procedure Laterality Date    ANKLE SURGERY Right      SECTION         Social History     Tobacco Use    Smoking status: Never    Smokeless tobacco: Never   Substance Use Topics    Alcohol use: No    Drug use: Yes     Types: Marijuana     Comment: prescribed THC       Physical Exam:    /76 (BP Location: Left arm, Patient Position: Sitting)   Pulse 70   Temp 97.5 °F (36.4 °C) (Oral)   Resp 17   Ht 5' 2" (1.575 m)   Wt 59 kg (130 lb)   SpO2 100%   Breastfeeding No   BMI 23.78 kg/m²   Nursing note and vital signs reviewed.  Constitutional:  Appears uncomfortable secondary to pain  Eyes: No conjunctival injection.  Extraocular muscles are intact.  ENT: Normal phonation.  Musculoskeletal:  Passive range of motion of all articulations unaffected left lower extremity.  No obvious bony " tenderness, bony deformity or laxity appreciated.  Diffuse tenderness palpation over the trochanteric bursa and ID band.  Tenderness palpation of the lateral malleolus and ATFL region.  No anterior drawer sign.  Neurovascularly intact  Skin: No rashes seen.  Good turgor.  No abrasions.  No ecchymoses.  Psych: Appropriate, conversant.        I decided to obtain the patient's medical records.    Results for orders placed or performed during the hospital encounter of 01/03/24   HIV 1/2 Ag/Ab (4th Gen)   Result Value Ref Range    HIV 1/2 Ag/Ab Negative Negative   Hepatitis C Antibody   Result Value Ref Range    Hepatitis C Ab Negative Negative   POCT COVID-19 Rapid Screening   Result Value Ref Range    POC Rapid COVID Negative Negative     Acceptable Yes    POCT Influenza A/B Molecular   Result Value Ref Range    POC Molecular Influenza A Ag Negative Negative, Not Reported    POC Molecular Influenza B Ag Negative Negative, Not Reported     Acceptable Yes    POCT urine pregnancy   Result Value Ref Range    POC Preg Test, Ur Negative Negative     Acceptable Yes      Imaging Results              X-Ray Knee 3 View Right (Final result)  Result time 04/24/24 10:26:10      Final result by Amelia Shahid MD (04/24/24 10:26:10)                   Impression:      No acute osseous abnormality seen.      Electronically signed by: Amelia Shahid  Date:    04/24/2024  Time:    10:26               Narrative:    EXAMINATION:  XR KNEE 3 VIEW RIGHT    CLINICAL HISTORY:  Pain, unspecified    TECHNIQUE:  AP, lateral, and Merchant views of the right knee were performed.    COMPARISON:  None    FINDINGS:  No acute fracture or dislocation seen.  Joint spaces are fairly well maintained without significant osteophyte formation.    No soft tissue edema or significant suprapatellar joint effusion.  No radiopaque retained foreign body.                                       X-Ray Ankle Complete  Right (Final result)  Result time 04/24/24 10:25:46      Final result by Amelia Shahid MD (04/24/24 10:25:46)                   Impression:      No acute osseous abnormality seen.      Electronically signed by: Amelia Shahid  Date:    04/24/2024  Time:    10:25               Narrative:    EXAMINATION:  XR ANKLE COMPLETE 3 VIEW RIGHT    CLINICAL HISTORY:  Pain, unspecified    TECHNIQUE:  AP, lateral, and oblique images of the right ankle were performed.    COMPARISON:  08/18/2022    FINDINGS:  No acute fracture or dislocation seen.  Talar dome is intact.  Ankle mortise is intact.    No soft tissue edema or radiopaque retained foreign body.                                      MDM:    43 y.o. female with right hip, ankle and knee pain.  Physical exam reveals patient well appearing in some distress with antalgic gait to room.  Passive range of motion of all articulations unaffected left lower extremity.  No obvious bony tenderness, bony deformity or laxity appreciated.  Diffuse tenderness palpation over the trochanteric bursa and ID band.  Tenderness palpation of the lateral malleolus and ATFL region.  No anterior drawer sign.  Neurovascularly intact.      DDX: fracture, sprain, dislocation, ITB band syndrome, strain, referred    ED management:  Patient has intolerance to NSAIDs.  Lidocaine patch and 1 time Norco given here with results pending.  X-ray with no acute bony process noted in right knee and ankle.  Discussed continued rice therapy for suspected strain and referred pain.  We will place ace wrap and provide crutches protection and comfort. Instructed patient on RICE, NSAID's for pain and swelling, limited ambulation and to follow up with Ortho.    Impression/Plan:The primary encounter diagnosis was Acute pain of right knee. Diagnoses of Pain and Chronic pain of right ankle were also pertinent to this visit.  Instructed to follow with Orthopedic.  Patient cautioned on when to return to ED.  Pt.  Understands and agrees with current treatment plan             Diagnostic Impression:    1. Acute pain of right knee    2. Pain    3. Chronic pain of right ankle         ED Disposition Condition    Discharge Stable            ED Prescriptions       Medication Sig Dispense Start Date End Date Auth. Provider    methylPREDNISolone (MEDROL DOSEPACK) 4 mg tablet use as directed 21 each 4/24/2024 5/15/2024 Opal Quezada PA    HYDROcodone-acetaminophen (NORCO) 5-325 mg per tablet Take 1 tablet by mouth every 6 (six) hours as needed for Pain. 5 tablet 4/24/2024 -- Opal Quezada PA          Follow-up Information       Follow up With Specialties Details Why Contact [facility]   [address] [facility] [address]  [phone]      Sunny Newsome MD Orthopedic Surgery Schedule an appointment as soon as possible for a visit   [address]  [address]  [facility] [address]  [phone]                   Opal Quezada PA  04/26/24 0939

## 2024-04-24 NOTE — ED TRIAGE NOTES
Pt c/o right leg pain that radiates downward. States the pain started Friday  after walking her dogs. Denies injury or trauma.

## 2024-04-24 NOTE — Clinical Note
"Sequeasia "Sequeasia" Heriberto was seen and treated in our emergency department on 4/24/2024.  She may return with limitations on 04/27/2024.  Light duty for 1 week or until cleared by follow up     Sincerely,      Opal Quezada PA    "

## 2024-08-16 ENCOUNTER — HOSPITAL ENCOUNTER (EMERGENCY)
Facility: OTHER | Age: 44
Discharge: HOME | End: 2024-08-16
Attending: EMERGENCY MEDICINE
Payer: MEDICAID

## 2024-08-16 VITALS
WEIGHT: 129 LBS | RESPIRATION RATE: 20 BRPM | HEART RATE: 105 BPM | BODY MASS INDEX: 23.74 KG/M2 | OXYGEN SATURATION: 95 % | SYSTOLIC BLOOD PRESSURE: 138 MMHG | HEIGHT: 62 IN | TEMPERATURE: 100 F | DIASTOLIC BLOOD PRESSURE: 69 MMHG

## 2024-08-16 DIAGNOSIS — R06.02 SHORTNESS OF BREATH: ICD-10-CM

## 2024-08-16 DIAGNOSIS — U07.1 COVID-19: Primary | ICD-10-CM

## 2024-08-16 LAB
B-HCG UR QL: NEGATIVE
CTP QC/QA: YES
CTP QC/QA: YES
SARS-COV-2 RDRP RESP QL NAA+PROBE: POSITIVE

## 2024-08-16 PROCEDURE — 25000242 PHARM REV CODE 250 ALT 637 W/ HCPCS: Performed by: NURSE PRACTITIONER

## 2024-08-16 PROCEDURE — 96360 HYDRATION IV INFUSION INIT: CPT

## 2024-08-16 PROCEDURE — 81025 URINE PREGNANCY TEST: CPT | Performed by: PHYSICIAN ASSISTANT

## 2024-08-16 PROCEDURE — 63600175 PHARM REV CODE 636 W HCPCS: Performed by: NURSE PRACTITIONER

## 2024-08-16 PROCEDURE — 25000003 PHARM REV CODE 250: Performed by: NURSE PRACTITIONER

## 2024-08-16 PROCEDURE — 87635 SARS-COV-2 COVID-19 AMP PRB: CPT | Performed by: NURSE PRACTITIONER

## 2024-08-16 PROCEDURE — 99284 EMERGENCY DEPT VISIT MOD MDM: CPT | Mod: 25

## 2024-08-16 RX ORDER — PREDNISONE 20 MG/1
60 TABLET ORAL
Status: COMPLETED | OUTPATIENT
Start: 2024-08-16 | End: 2024-08-16

## 2024-08-16 RX ORDER — ACETAMINOPHEN 500 MG
1000 TABLET ORAL
Status: COMPLETED | OUTPATIENT
Start: 2024-08-16 | End: 2024-08-16

## 2024-08-16 RX ORDER — ALBUTEROL SULFATE 90 UG/1
6 INHALANT RESPIRATORY (INHALATION) ONCE
Status: COMPLETED | OUTPATIENT
Start: 2024-08-16 | End: 2024-08-16

## 2024-08-16 RX ORDER — IBUPROFEN 400 MG/1
800 TABLET ORAL
Status: COMPLETED | OUTPATIENT
Start: 2024-08-16 | End: 2024-08-16

## 2024-08-16 RX ADMIN — SODIUM CHLORIDE 1000 ML: 9 INJECTION, SOLUTION INTRAVENOUS at 01:08

## 2024-08-16 RX ADMIN — ACETAMINOPHEN 1000 MG: 500 TABLET ORAL at 11:08

## 2024-08-16 RX ADMIN — IBUPROFEN 800 MG: 400 TABLET ORAL at 01:08

## 2024-08-16 RX ADMIN — ALBUTEROL SULFATE 6 PUFF: 90 AEROSOL, METERED RESPIRATORY (INHALATION) at 01:08

## 2024-08-16 RX ADMIN — PREDNISONE 60 MG: 20 TABLET ORAL at 01:08

## 2024-08-16 NOTE — ED NOTES
+sob that started this morning unrelieved by inhaler. Hx of asthma. No acute respiratory distress noted. Pt aaox4

## 2024-08-16 NOTE — ED PROVIDER NOTES
Encounter Date: 2024       History     Chief Complaint   Patient presents with    Shortness of Breath     Pt. Tested positive for covid at school 1 hour ago. Pt. Complaining of SOB,body aches,sore throat & chills. Pt. Has a HX of asthma. Pt.'s temp in triage 101.4. Pt. Is alert and ABC's are intact.     44-year-old female with a history of asthma which presents to the emergency room with body aches, cough, congestion and pain with touching her skin.  Patient states that she used her inhaler about 830 this morning and continued to feel bad so she took a COVID test at work which was positive.  Patient states that she feels dizzy when she ambulates but otherwise no other complaints.    The history is provided by the patient.     Review of patient's allergies indicates:   Allergen Reactions    Nsaids (non-steroidal anti-inflammatory drug)      History of GERD. Gets bad heartburn after NSAIDs even when GERD is well controlled.      Past Medical History:   Diagnosis Date    Asthma     Migraine headache      Past Surgical History:   Procedure Laterality Date    ANKLE SURGERY Right      SECTION       No family history on file.  Social History     Tobacco Use    Smoking status: Never    Smokeless tobacco: Never   Substance Use Topics    Alcohol use: No    Drug use: Yes     Types: Marijuana     Comment: prescribed THC     Review of Systems   Constitutional:  Positive for chills and fever.   HENT:  Negative for sore throat.    Respiratory:  Positive for chest tightness and shortness of breath.    Cardiovascular:  Negative for chest pain.   Gastrointestinal:  Negative for nausea.   Genitourinary:  Negative for dysuria.   Musculoskeletal:  Positive for myalgias. Negative for back pain.   Skin:  Negative for rash.   Neurological:  Negative for weakness.   Hematological:  Does not bruise/bleed easily.   All other systems reviewed and are negative.    Physical Exam     Initial Vitals [24 1102]   BP Pulse Resp  Temp SpO2   (!) 134/90 (!) 114 20 (!) 101.4 °F (38.6 °C) 98 %      MAP       --         Physical Exam    Nursing note and vitals reviewed.  Constitutional: She appears well-developed and well-nourished.   HENT:   Head: Normocephalic and atraumatic.   Eyes: Conjunctivae and EOM are normal. Pupils are equal, round, and reactive to light.   Neck:   Normal range of motion.  Cardiovascular:  Normal rate, regular rhythm, normal heart sounds and intact distal pulses.     Exam reveals no gallop and no friction rub.       No murmur heard.  Pulmonary/Chest: Breath sounds normal. No respiratory distress. She has no wheezes. She has no rhonchi. She has no rales. She exhibits no tenderness.   Abdominal: Abdomen is soft. Bowel sounds are normal. She exhibits no distension and no mass. There is no abdominal tenderness. There is no rebound and no guarding.   Musculoskeletal:         General: Tenderness present. No edema. Normal range of motion.      Cervical back: Normal range of motion.      Comments: Whole-body tenderness     Neurological: She is alert and oriented to person, place, and time. She has normal strength. GCS score is 15. GCS eye subscore is 4. GCS verbal subscore is 5. GCS motor subscore is 6.   Skin: Skin is warm. Capillary refill takes less than 2 seconds. No rash noted. No erythema.   Psychiatric: She has a normal mood and affect.       ED Course   Procedures  Labs Reviewed   SARS-COV-2 RDRP GENE - Abnormal       Result Value    POC Rapid COVID Positive (*)      Acceptable Yes     POCT URINE PREGNANCY    POC Preg Test, Ur Negative       Acceptable Yes            Imaging Results              X-Ray Chest PA And Lateral (Final result)  Result time 08/16/24 12:03:44      Final result by Pawan Bowens III, MD (08/16/24 12:03:44)                   Impression:      No acute process seen.      Electronically signed by: Pawan Bowens MD  Date:    08/16/2024  Time:    12:03                Narrative:    EXAMINATION:  XR CHEST PA AND LATERAL    CLINICAL HISTORY:  Shortness of breath    FINDINGS:  Chest two views: Heart size is lungs are clear the bones are noncontributory.                                       Medications   acetaminophen tablet 1,000 mg (1,000 mg Oral Given 8/16/24 1148)   albuterol inhaler 6 puff (6 puffs Inhalation Given 8/16/24 1315)   predniSONE tablet 60 mg (60 mg Oral Given 8/16/24 1314)   ibuprofen tablet 800 mg (800 mg Oral Given 8/16/24 1357)   sodium chloride 0.9% bolus 1,000 mL 1,000 mL (0 mLs Intravenous Stopped 8/16/24 1520)     Medical Decision Making  44-year-old female which presents to the emergency room with viral URI symptoms consistent with COVID.  Her COVID test is positive.  She was given 60 mg of prednisone.  Her chest x-ray does not show any infiltrates or evidence of pleural effusion or pneumothorax.  She was also given albuterol inhaler puffs while in the ED. Fever was treated with Tylenol.  Dizziness resolved once fever resolved.  The patient has been advised to use her inhaler 2 puffs every 4 hours for the next 48 hours.  She was ambulated while in the ED and did not have any desaturation.  She has been advised to purchase a portable pulse ox.  She was also given a L of fluid while in the ED. She felt much better at discharge.  Patient given strict return precautions and voiced understanding of all discharge instructions. Pt was stable at discharge.       Differential diagnosis: COVID, influenza, viral URI, dehydration    Problems Addressed:  COVID-19: acute illness or injury  Shortness of breath: acute illness or injury    Amount and/or Complexity of Data Reviewed  Labs: ordered. Decision-making details documented in ED Course.  Radiology: ordered.    Risk  OTC drugs.  Prescription drug management.               ED Course as of 08/16/24 1723   Fri Aug 16, 2024   1146 BP(!): 134/90 [AT]   1146 Temp(!): 101.4 °F (38.6 °C) [AT]   1146 Temp Source: Oral [AT]    1146 Pulse(!): 114 [AT]   1146 Resp: 20 [AT]   1146 SpO2: 98 % [AT]   1149 hCG Qualitative, Urine: Negative [AT]   1239 SARS-CoV-2 RNA, Amplification, Qual(!): Positive [AT]      ED Course User Index  [AT] Cleo Saul FNP                           Clinical Impression:  Final diagnoses:  [R06.02] Shortness of breath  [U07.1] COVID-19 (Primary)          ED Disposition Condition    Discharge Stable          ED Prescriptions    None       Follow-up Information       Follow up With Specialties Details Why Contact Info    primary care provider as needed   As needed              Cleo Saul FNP  08/16/24 0725

## 2024-08-16 NOTE — Clinical Note
"Sequeasia "Sequeasia" Heriberto was seen and treated in our emergency department on 8/16/2024.  She may return to work on 08/22/2024.       If you have any questions or concerns, please don't hesitate to call.      Cleo Saul, SOFYP"

## 2024-08-16 NOTE — DISCHARGE INSTRUCTIONS
Tylenol or motrin as needed for fever  Keep hydrated with powerade, gatorade or pedialyte  No school/work until fever free for 24 hours without medication after the quarantine  Quarantine for 5 days  Clean all door handles and common counter services
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
